# Patient Record
Sex: MALE | Employment: UNEMPLOYED | URBAN - METROPOLITAN AREA
[De-identification: names, ages, dates, MRNs, and addresses within clinical notes are randomized per-mention and may not be internally consistent; named-entity substitution may affect disease eponyms.]

---

## 2017-01-02 ENCOUNTER — PRE VISIT (OUTPATIENT)
Dept: OTOLARYNGOLOGY | Facility: CLINIC | Age: 62
End: 2017-01-02

## 2017-01-02 NOTE — TELEPHONE ENCOUNTER
9/15/16 note received from [facility], will forward to clinic.  Included:   Audiogram on 8/8/16

## 2017-01-02 NOTE — TELEPHONE ENCOUNTER
1.  Date/reason for appt: 1/23/17 right side traumatic facial nerve palsy   2.  Referring provider: MAXIMILIANO GALAN  3.  Call to patient (Yes / No - short description): no, referral  4.  Previous care at / records requested from: [facility]   5.  Other: 9/15/16 note received from [facility], will forward to clinic. Will need to request additional records.

## 2017-01-23 ENCOUNTER — OFFICE VISIT (OUTPATIENT)
Dept: OTOLARYNGOLOGY | Facility: CLINIC | Age: 62
End: 2017-01-23

## 2017-01-23 VITALS — WEIGHT: 240 LBS | HEIGHT: 69 IN | BODY MASS INDEX: 35.55 KG/M2

## 2017-01-23 DIAGNOSIS — R49.0 DYSPHONIA: ICD-10-CM

## 2017-01-23 DIAGNOSIS — R13.11 ORAL PHASE DYSPHAGIA: ICD-10-CM

## 2017-01-23 DIAGNOSIS — R47.1 DYSARTHRIA: Primary | ICD-10-CM

## 2017-01-23 DIAGNOSIS — G51.0 FACIAL PARALYSIS: ICD-10-CM

## 2017-01-23 RX ORDER — AMANTADINE HYDROCHLORIDE 100 MG/1
100 CAPSULE, GELATIN COATED ORAL
COMMUNITY
Start: 2016-12-20

## 2017-01-23 RX ORDER — VENLAFAXINE HYDROCHLORIDE 150 MG/1
150 CAPSULE, EXTENDED RELEASE ORAL
COMMUNITY
Start: 2016-10-17 | End: 2018-03-14

## 2017-01-23 RX ORDER — VENLAFAXINE HYDROCHLORIDE 75 MG/1
TABLET, EXTENDED RELEASE ORAL
COMMUNITY
Start: 2016-10-17 | End: 2018-03-14

## 2017-01-23 RX ORDER — VENLAFAXINE HYDROCHLORIDE 150 MG/1
TABLET, EXTENDED RELEASE ORAL
COMMUNITY
Start: 2016-12-09 | End: 2017-06-07

## 2017-01-23 RX ORDER — SILDENAFIL 100 MG/1
TABLET, FILM COATED ORAL PRN
COMMUNITY
Start: 2016-11-29

## 2017-01-23 RX ORDER — LORAZEPAM 1 MG/1
1 TABLET ORAL
COMMUNITY
Start: 2016-10-05 | End: 2017-12-13 | Stop reason: ALTCHOICE

## 2017-01-23 RX ORDER — ROSUVASTATIN CALCIUM 40 MG/1
TABLET, COATED ORAL
COMMUNITY
Start: 2009-05-01 | End: 2018-03-14

## 2017-01-23 RX ORDER — OMEPRAZOLE 40 MG/1
CAPSULE, DELAYED RELEASE ORAL EVERY MORNING
COMMUNITY
Start: 2016-10-30

## 2017-01-23 ASSESSMENT — PAIN SCALES - GENERAL: PAINLEVEL: NO PAIN (0)

## 2017-01-23 NOTE — NURSING NOTE
Photodocumentation and video obtained today. IOP measurements obtained today.   Patient given information regarding botox and filler for facial paralysis.   Sosa Mcintyre RN, [phone]  1/23/2017 3:37 PM

## 2017-01-23 NOTE — NURSING NOTE
Chief Complaint   Patient presents with     Consult     [facility] eye clinic referral     [facility]

## 2017-01-23 NOTE — LETTER
1/23/2017     RE: Omega Almeida  [address]  [place] [address]     Dear Colleague,    Thank you for referring your patient, Omega Almeida, to the [facility] EAR NOSE AND THROAT at [facility]. Please see a copy of my visit note below.    Facial Plastic and Reconstructive Surgery Consultation    Referring Provider:   Karen Call  [facility]  [address] [place] [address]    HPI:   I had to pleasure of seeing Omega Almeida today in clinic for consultation for facial weakness.  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   He was evaluated and [place] at [facility] [place]. 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     He had complete right sided facial paralysis at the time of injury. He notes that he began to have the return of function 2-3 months ago and believes it is still gradually improving.  He has not had an EMG to assess his facial function. He does describe hearing loss on the right side, had an initial audiogram but not one since. He believes his hearing to be improved also.  He denies any imbalance.     He notices difficulty with speech and that he has difficulty drinking liquids and using a straw. He has had to adapt his eating because of this.  He cannot hold air in his mouth.      Review Of Systems  ROS: 10 point ROS neg other than the symptoms noted above in the HPI.    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     Malignant neoplasm of posterior wall of urinary bladder (H)     Hand paresthesia     Obstructive sleep apnea syndrome     Pain in thoracic spine     Prostatitis     Pure hypercholesterolemia     Rotator cuff syndrome     Sebaceous cyst     Asymmetrical sensorineural hearing loss     Injury of musculoskeletal system     Testicular hypofunction     Traumatic brain injury (H)     Past Surgical History   Procedure Laterality Date     C nonspecific procedure  1962     stabismus/amblyopia     Current Outpatient Prescriptions   Medication Sig Dispense Refill     rosuvastatin (CRESTOR) 40 MG tablet        amantadine (SYMMETREL) 100 MG capsule        venlafaxine (EFFEXOR-XR) 150 MG 24 hr capsule Take 150 mg by mouth       omeprazole (PRILOSEC) 40 MG capsule        LORazepam (ATIVAN) 1 MG tablet Take 1 mg by mouth       DHA-EPA-VITAMIN E PO        venlafaxine (EFFEXOR-ER) 150 MG TB24 24 hr tablet        venlafaxine (EFFEXOR-ER) 75 MG TB24 24 hr tablet        sildenafil (VIAGRA) 100 MG cap/tab        MULTIVITAMIN TABS   OR 1 po qd       VITAMIN E 400 IU OR CAPS  1 po qd       LIPITOR 20 MG OR TABS 1 tab PO QD (Once per day)       PROZAC 40 MG OR CAPS 1 CAPSULE EVERY MORNING       No known drug allergies  Social History     Social History     Marital Status:      Spouse Name: john lentz     Number of Children: 5     Years of Education: 18     Occupational History     Not on file.     Social History Main Topics     Smoking status: Former Smoker -- 2.00 packs/day for 2 years     Types: Cigarettes     Start date: 09/07/1993     Quit date: 10/07/1996     Smokeless tobacco: Not on file      Comment: quit age 32     Alcohol Use: No      Comment: socially     Drug Use: No     Sexual Activity:     Partners: Female     Birth Control/ Protection: Post-menopausal     Other Topics Concern     Caffeine Concern Yes     1-2 cups qd     Exercise Yes     avid raquetball     Seat Belt Yes      Social History Narrative     Family History   Problem Relation Age of Onset     Breast Cancer Maternal Grandmother      CANCER Brother      ceased     HEART DISEASE Father      ceased     Depression Father        PE:  Alert and Oriented, Answering Questions Appropriately  Normocephalic, left intact facial nerve function  Significant hoarseness and voice strain  Right side with no brow elevation  perioribital oculo-oral synkinesis with narrowed right palpebral aperture  No right oral commissure excursion   Oral incompetence with incomplete lip closure, unable to hold air in mouth  platysmal synkinesis  Skin: Henry 2  Facial Nerve Intact and facial movement symmetric  EOM's, PEERL  Nasal Exam: No external Deformity, no mucopurulence or polyps, no masses  Chin: Normal   Lips/Teeth/Toungue/Gums: Lips intact, Normal Dentition, Occlusion intact, Oral mucosa intact, no lesions/ulcerations/masses, Tongue mobile  OP: Palate elevates with speech, Mallampati 2, Uvula midline  Neck: No lymphadenopathy, no thyromegaly, trachea midline  Chest: No wheezing, cyanosis, or stridor  Card: Normal upper extremity pulses and capillary refill, not diaphoretic  Neuro/Psych: CN's 2-12 intact except for right sided facial nerve palsy, Moves all extremities, ambulation in intact, positive affect, no notable muscle weakness      PROCEDURE:flexible fiberoptic laryngoscopy   Indication: trauma with voice strain   Performed by: Kaci Trevino     Fiberoptic Laryngoscopy is performed to examine the upper airway for pathology, functional or anatomic abnormality. Verbal consent was obtained. The flexible endoscope was passed into each nasal cavity separately.  Findings are as follows:   Velopharynx: normal with good elevation of the soft palate              Base of Tongue: normal   Supraglottis: Significant supraglottic hyperfunction              Larynx: bilateral true vocal fold motion is present.  No masses or lesions seen.                  Epiglottis intact with no lesions, pyriforms patent with no lesions.     The patient tolerated the procedure well with no complications.                     IMPRESSION:  (R47.1) Dysarthria  (primary encounter diagnosis)  Plan: SPEECH THERAPY REFERRAL    (R13.11) Oral phase dysphagia  Plan: SPEECH THERAPY REFERRAL    (R49.0) Dysphonia  Plan: SPEECH THERAPY REFERRAL       (G51.0) Facial paralysis  Plan: NEEDLE EMG GUIDE W CHEMODENERVATION, NEEDLE EMG        OF CRANIAL NERVE BILATERAL              PLAN:    Orders Placed This Encounter   Procedures     NEEDLE EMG GUIDE W CHEMODENERVATION     NEEDLE EMG OF CRANIAL NERVE BILATERAL     SPEECH THERAPY REFERRAL     SPEECH THERAPY REFERRAL     Omega Almeida has incomplete recovery of his right facial function after a fall with facial nerve injury. He has some contraction visible which per his report has had late onset of recovery.   I would like an EMG to categorize his enervation status.   He also has oral incompetence with oral phase dysphagia. I would like him to see Shannan Francisco. I also discussed the benefits of filler.   I believe Shannan would be very important in helping him with his speech also.     He had reanimation options in this time period, they would be surgical and I would seek them out sooner than later. The factor that clouds the decision at this time is the fact that he describes improvement int he recent months.     I would like to meet with him again and discuss further once I have his results.    He also has hoarseness and significant supraglottic hyperfunction on exam, I am going to refer him to our voice clinic.     Photodocumentation was obtained.     I spent a total of 45 minutes face-to-face with Omega Almeida during today's office visit.  Over 50% of this time was spent counseling the patient and/or coordinating care regarding his injury, his facial nerve status, describing treatment options, coordinating further work up and referrals. .   See note for details.      Again, thank you for allowing me to participate in the care of your patient.      Sincerely,    Kaci Trevino MD

## 2017-01-23 NOTE — Clinical Note
1/23/2017       RE: Omega Almeida  [address]  [place] [facility] [place] [facility] [address]     Dear Colleague,    Thank you for referring your patient, Omega Almeida, to the [facility] EAR NOSE AND THROAT at [facility]. Please see a copy of my visit note below.    No notes on file    Again, thank you for allowing me to participate in the care of your patient.      Sincerely,    Kaci Trevino MD

## 2017-01-23 NOTE — MR AVS SNAPSHOT
After Visit Summary   1/23/2017    Omega Almeida    MRN: [id number]           Patient Information     Date Of Birth          1955        Visit Information        Provider Department      1/23/2017 2:00 PM Kaci Trevino MD [facility] Ear Nose and Throat        Today's Diagnoses     Dysarthria    -  1    Oral phase dysphagia        Dysphonia        Facial paralysis           Follow-ups after your visit        Additional Services     SPEECH THERAPY REFERRAL       *This therapy referral will be filtered to a centralized scheduling office at [facility] and the patient will receive a call to schedule an appointment at a [place] location most convenient for them. *     [facility] provides Speech Therapy evaluation and treatment and many specialty services across the [place] system.  If requesting a specialty program, please choose from the list below.  If you have not heard from the scheduling office within 2 business days, please call [phone] for all locations, with the exception of [place], please call [phone].       Treatment: Evaluation & Treatment  Speech Treatment Diagnosis: Dysarthria & Oral Phase Dysphagia  Special Instructions: Please schedule with Shannan Francisco at [facility]  Special Programs: Facial Paralysis    Please be aware that coverage of these services is subject to the terms and limitations of your health insurance plan.  Call member services at your health plan with any benefit or coverage questions.      **Note to Provider:  If you are referring outside of [place] for the therapy appointment, please list the name of the location in the  special instructions  above, print the referral and give to the patient to schedule the appointment.            SPEECH THERAPY REFERRAL       *This therapy referral will be filtered to a centralized scheduling office at [facility] and the patient will receive a call to schedule  an appointment at a [place] location most convenient for them. *     [facility] provides Speech Therapy evaluation and treatment and many specialty services across the [place] system.  If requesting a specialty program, please choose from the list below.  If you have not heard from the scheduling office within 2 business days, please call [phone] for all locations, with the exception of [place], please call [phone].       Treatment: Evaluation & Treatment  Speech Treatment Diagnosis: Dysphonia  Special Instructions: Supraglottic hyper contracture  Special Programs: Referral to [facility]    Please be aware that coverage of these services is subject to the terms and limitations of your health insurance plan.  Call member services at your health plan with any benefit or coverage questions.      **Note to Provider:  If you are referring outside of [place] for the therapy appointment, please list the name of the location in the  special instructions  above, print the referral and give to the patient to schedule the appointment.                  Your next 10 appointments already scheduled     Apr 03, 2017 10:30 AM CDT   Treatment 45 with Belinda Francisco, SLP   [facility], [place], Speech Therapy - Oupatient ([facility])    [address], [address]  [place] [address]   [phone]              Who to contact     Please call your clinic at [phone] to:    Ask questions about your health    Make or cancel appointments    Discuss your medicines    Learn about your test results    Speak to your doctor   If you have compliments or concerns about an experience at your clinic, or if you wish to file a complaint, please contact [facility] Patient Relations at [phone] or email us at [email or web address]         Additional Information About Your Visit        MyChart Information     [employer] gives you  "secure access to your electronic health record. If you see a primary care provider, you can also send messages to your care team and make appointments. If you have questions, please call your primary care clinic.  If you do not have a primary care provider, please call [phone] and they will assist you.      [employer] is an electronic gateway that provides easy, online access to your medical records. With [employer], you can request a clinic appointment, read your test results, renew a prescription or communicate with your care team.     To access your existing account, please contact your [facility] or call [phone] for assistance.        Care EveryWhere ID     This is your Care EveryWhere ID. This could be used by other organizations to access your [facility] medical records  [id number]        Your Vitals Were     Height BMI (Body Mass Index)                1.753 m (5' 9\") 35.44 kg/m2           Blood Pressure from Last 3 Encounters:   11/21/03 136/78    Weight from Last 3 Encounters:   01/23/17 108.9 kg (240 lb)   11/21/03 102.1 kg (225 lb)              We Performed the Following     LARYNGOSCOPY FLEX FIBEROPTIC, DIAGNOSTIC     NEEDLE EMG GUIDE W CHEMODENERVATION     NEEDLE EMG OF CRANIAL NERVE BILATERAL     SPEECH THERAPY REFERRAL     SPEECH THERAPY REFERRAL        Primary Care Provider Office Phone # Fax #    Vinicius Bray [phone]       [facility] [address] [facility] [address]        Thank you!     Thank you for choosing [facility] EAR NOSE AND THROAT  for your care. Our goal is always to provide you with excellent care. Hearing back from our patients is one way we can continue to improve our services. Please take a few minutes to complete the written survey that you may receive in the mail after your visit with us. Thank you!             Your Updated Medication List - Protect others around you: Learn how to safely use, store " and throw away your medicines at www.disposemymeds.org.          This list is accurate as of: 1/23/17 11:59 PM.  Always use your most recent med list.                   Brand Name Dispense Instructions for use    amantadine 100 MG capsule    SYMMETREL         CRESTOR 40 MG tablet   Generic drug:  rosuvastatin          DHA-EPA-VITAMIN E PO          LIPITOR 20 MG tablet   Generic drug:  atorvastatin      1 tab PO QD (Once per day)       LORazepam 1 MG tablet    ATIVAN     Take 1 mg by mouth       MULTIVITAMIN TABS   OR      1 po qd       omeprazole 40 MG capsule    priLOSEC         PROZAC 40 MG capsule   Generic drug:  FLUoxetine      1 CAPSULE EVERY MORNING       * venlafaxine 150 MG 24 hr capsule    EFFEXOR-XR     Take 150 mg by mouth       * venlafaxine 75 MG Tb24 24 hr tablet    EFFEXOR-ER         * venlafaxine 150 MG Tb24 24 hr tablet    EFFEXOR-ER         VIAGRA 100 MG cap/tab   Generic drug:  sildenafil          vitamin E 400 UNIT capsule      1 po qd       * Notice:  This list has 3 medication(s) that are the same as other medications prescribed for you. Read the directions carefully, and ask your doctor or other care provider to review them with you.

## 2017-01-30 ENCOUNTER — PRE VISIT (OUTPATIENT)
Dept: OTOLARYNGOLOGY | Facility: CLINIC | Age: 62
End: 2017-01-30

## 2017-01-30 NOTE — TELEPHONE ENCOUNTER
1.  Date/reason for appt: 2/8/17 - dysphonia  2.  Referring provider: Dr. Jacqueline Trevino  3.  Call to patient (Yes / No - short description): no, referred / rec's from [facility] were already forwarded to clinic coordinators on 1/2/17  4.  Previous care at:   - [facility]   - [facility]

## 2017-01-31 ENCOUNTER — TRANSFERRED RECORDS (OUTPATIENT)
Dept: HEALTH INFORMATION MANAGEMENT | Facility: CLINIC | Age: 62
End: 2017-01-31

## 2017-02-06 NOTE — PROGRESS NOTES
Facial Plastic and Reconstructive Surgery Consultation    Referring Provider:   Karen Call  [facility]  [address] [facility] [address]  [place] [address]    HPI:   I had to pleasure of seeing Omega Almeida today in clinic for consultation for facial weakness.  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   He was evaluated and treated at [facility] where Dr. 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     He had complete right sided facial paralysis at the time of injury. He notes that he began to have the return of function 2-3 months ago and believes it is still gradually improving.  He has not had an EMG to assess his facial function. He does describe hearing loss on the right side, had an initial audiogram but not one since. He believes his hearing to be improved also.  He denies any imbalance.     He notices difficulty with speech and that he has difficulty drinking liquids and using a straw. He has had to adapt his eating because of this.  He cannot hold air in his mouth.      Review Of Systems  ROS: 10 point ROS neg other than the symptoms noted above in the HPI.    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 trauma with behavioral disturbance     Malignant neoplasm of posterior wall of urinary bladder (H)     Hand paresthesia     Obstructive sleep apnea syndrome     Pain in thoracic spine     Prostatitis     Pure hypercholesterolemia     Rotator cuff syndrome     Sebaceous cyst      Asymmetrical sensorineural hearing loss     Injury of musculoskeletal system     Testicular hypofunction     Traumatic brain injury (H)     Past Surgical History   Procedure Laterality Date     C nonspecific procedure  1962     stabismus/amblyopia     Current Outpatient Prescriptions   Medication Sig Dispense Refill     rosuvastatin (CRESTOR) 40 MG tablet        amantadine (SYMMETREL) 100 MG capsule        venlafaxine (EFFEXOR-XR) 150 MG 24 hr capsule Take 150 mg by mouth       omeprazole (PRILOSEC) 40 MG capsule        LORazepam (ATIVAN) 1 MG tablet Take 1 mg by mouth       DHA-EPA-VITAMIN E PO        venlafaxine (EFFEXOR-ER) 150 MG TB24 24 hr tablet        venlafaxine (EFFEXOR-ER) 75 MG TB24 24 hr tablet        sildenafil (VIAGRA) 100 MG cap/tab        MULTIVITAMIN TABS   OR 1 po qd       VITAMIN E 400 IU OR CAPS  1 po qd       LIPITOR 20 MG OR TABS 1 tab PO QD (Once per day)       PROZAC 40 MG OR CAPS 1 CAPSULE EVERY MORNING       No known drug allergies  Social History     Social History     Marital Status:      Spouse Name: john lentz     Number of Children: 5     Years of Education: 18     Occupational History     Not on file.     Social History Main Topics     Smoking status: Former Smoker -- 2.00 packs/day for 2 years     Types: Cigarettes     Start date: 09/07/1993     Quit date: 10/07/1996     Smokeless tobacco: Not on file      Comment: quit age 32     Alcohol Use: No      Comment: socially     Drug Use: No     Sexual Activity:     Partners: Female     Birth Control/ Protection: Post-menopausal     Other Topics Concern     Caffeine Concern Yes     1-2 cups qd     Exercise Yes     avid raquetball     Seat Belt Yes     Social History Narrative     Family History   Problem Relation Age of Onset     Breast Cancer Maternal Grandmother      CANCER Brother      ceased     HEART DISEASE Father      ceased     Depression Father        PE:  Alert and Oriented, Answering Questions Appropriately  Normocephalic,  left intact facial nerve function  Significant hoarseness and voice strain  Right side with no brow elevation  perioribital oculo-oral synkinesis with narrowed right palpebral aperture  No right oral commissure excursion   Oral incompetence with incomplete lip closure, unable to hold air in mouth  platysmal synkinesis  Skin: Henry 2  Facial Nerve Intact and facial movement symmetric  EOM's, PEERL  Nasal Exam: No external Deformity, no mucopurulence or polyps, no masses  Chin: Normal   Lips/Teeth/Toungue/Gums: Lips intact, Normal Dentition, Occlusion intact, Oral mucosa intact, no lesions/ulcerations/masses, Tongue mobile  OP: Palate elevates with speech, Mallampati 2, Uvula midline  Neck: No lymphadenopathy, no thyromegaly, trachea midline  Chest: No wheezing, cyanosis, or stridor  Card: Normal upper extremity pulses and capillary refill, not diaphoretic  Neuro/Psych: CN's 2-12 intact except for right sided facial nerve palsy, Moves all extremities, ambulation in intact, positive affect, no notable muscle weakness      PROCEDURE:flexible fiberoptic laryngoscopy   Indication: trauma with voice strain   Performed by: Kaci Trevino     Fiberoptic Laryngoscopy is performed to examine the upper airway for pathology, functional or anatomic abnormality. Verbal consent was obtained. The flexible endoscope was passed into each nasal cavity separately.  Findings are as follows:   Velopharynx: normal with good elevation of the soft palate              Base of Tongue: normal   Supraglottis: Significant supraglottic hyperfunction              Larynx: bilateral true vocal fold motion is present.  No masses or lesions seen.                 Epiglottis intact with no lesions, pyriforms patent with no lesions.     The patient tolerated the procedure well with no complications.                     IMPRESSION:  (R47.1) Dysarthria  (primary encounter diagnosis)  Plan: SPEECH THERAPY REFERRAL    (R13.11) Oral phase  dysphagia  Plan: SPEECH THERAPY REFERRAL    (R49.0) Dysphonia  Plan: SPEECH THERAPY REFERRAL       (G51.0) Facial paralysis  Plan: NEEDLE EMG GUIDE W CHEMODENERVATION, NEEDLE EMG        OF CRANIAL NERVE BILATERAL              PLAN:    Orders Placed This Encounter   Procedures     NEEDLE EMG GUIDE W CHEMODENERVATION     NEEDLE EMG OF CRANIAL NERVE BILATERAL     SPEECH THERAPY REFERRAL     SPEECH THERAPY REFERRAL     Omega Almeida has incomplete recovery of his right facial function after a fall with facial nerve injury. He has some contraction visible which per his report has had late onset of recovery.   I would like an EMG to categorize his enervation status.   He also has oral incompetence with oral phase dysphagia. I would like him to see Shannan Francisco. I also discussed the benefits of filler.   I believe Shannan would be very important in helping him with his speech also.     He had reanimation options in this time period, they would be surgical and I would seek them out sooner than later. The factor that clouds the decision at this time is the fact that he describes improvement int he recent months.     I would like to meet with him again and discuss further once I have his results.    He also has hoarseness and significant supraglottic hyperfunction on exam, I am going to refer him to our voice clinic.     Photodocumentation was obtained.     I spent a total of 45 minutes face-to-face with Omega Almeida during today's office visit.  Over 50% of this time was spent counseling the patient and/or coordinating care regarding his injury, his facial nerve status, describing treatment options, coordinating further work up and referrals. .  See note for details.

## 2017-02-09 ENCOUNTER — OFFICE VISIT (OUTPATIENT)
Dept: OTOLARYNGOLOGY | Facility: CLINIC | Age: 62
End: 2017-02-09

## 2017-02-09 DIAGNOSIS — R49.0 DYSPHONIA: Primary | ICD-10-CM

## 2017-02-09 NOTE — Clinical Note
"2/9/2017       RE: Omega Almeida  [address] [place] [address]     Dear Colleague,    Thank you for referring your patient, Omega Almeida, to the [facility]. Please see a copy of my visit note below.    [facility]  Elmer Kent Jr., M.D., F.A.C.S.  Racquel Solomon M.D., M.P.H.  Madelaine Stevenson, Ph.D., CCC/SLP  Coral Vogel M.M. (voice), M.A., CCC/SLP  Colt Hdz M.M. (voice), MBENY., [facility]SLP    [facility]  INITIAL EVALUATION AND LARYNGEAL EXAMINATION REPORT    Patient: Omega Almeida  Date of Visit: 2/9/2017    CHIEF COMPLAINT: Dysarthria, facial paralysis, hoarseness    HISTORY  PATIENT INFORMATION  Omega Almeida was seen for initial voice evaluation, laryngeal examination and treatment today.  He is seen at the kind referral of Dr. Kaci Hutchinson. Please refer to they physician s dictation for a more complete history and impressions.     DIAGNOSIS/REASON FOR REFERRAL  Dysphonia/ Evaluate, perform laryngeal exam, treat as appropriate    HISTORY OF VOICE DISORDER  Mr. Almeida is a 62 year old gentleman with a history of dysphonia.  Salient details of his history are as follows:    Traumatic brain injury in May of 2016    He was in a coma for 3 weeks, and hospitalized for 2 months, with extensive rehab subsequently    He had a complete right sided facial paralysis at the time of injury which began to recover 2-3 months ago    He feels that this is still ongoing    Changes to voice and speech quality began along the same timeline    His primary complaint is that he feels the right side of his face fatigues with speech    He notes lack of clarity with certain consonant sounds (he demonstrated \"th\" and bilabials)    He notes subtle ongoing memory issues, particularly with short term memory    CURRENT SYMPTOMS INCLUDE:  VOICE    Voice is raspy and rough    Vocal fatigue     He feels that his voice quality is " "\"more mediocre\" than in the past    He had significant vocal demands in his previous work as a , and would like to return to a vocally active job in the future.    Denies significant dyspnea, dysphagia, cough, nor pain    OTHER PERTINENT HISTORY    Please also refer to Dr. Hutchinson's dictation.     OBJECTIVE FINDINGS  VOICE AND SPEECH EVALUATION  An evaluation of the voice was accomplished and audio recorded today; salient features are as follows:    Breathing pattern: phonation is not coordinated with respiration and talks to past end of breath stream    Tension is evident: jaw and neck    VOICE:    No perceptible breathiness    Mild to moderate, Intermittent roughness    Moderate, Intermittent strangled strain during running speech. Does not seem to be phoneme dependent    Habitual pitch was not formally tested, but is judged to be WNL and appropriate    Intensity:     Conversational speech - informally judged to be WNL for the setting    Projected speech - appropriate increase in intensity with moderate increase in strain    Sustained phonation: increased breathiness, decreased roughness.  Consistent across vowel contexts    Pitch Glide task: patient is able to access upper register, but with significant increased strain and intermittent instability during register transitions.     Singing vs. Speech - singing demonstrates reduced strain and roughness compared to speech    He states today is a tpical voice day    He rates his effort as 0 out of 10 (10 is maximum effort) for speech; 1 out of 10 for singing    He rates overall voice quality as 2 out of 10 (10 being worst)    GLOBAL ASSESSMENT OF DYSPHONIA:  16/100    CAPE-V Overall Severity:  28/100    SPEECH:    Formal testing of articulation was not completed    Intelligibility informally judged to be greater than 90% intelligible     Primary errors included intermittent distortion of bilabial stops and continuants, labiodental and " interdental fricatives    COUGH/AIRWAY:    Not observed    LARYNGEAL EXAMINATION  Kolby Hdz M.M., M.A., CCC/SLP accomplished the endoscopic laryngeal examination today.  Verbal consent was obtained and witnessed prior to this procedure.   A time-out was performed, verifying patient, procedure, and site.   Type of exam:   Procedure: Flexible endoscopy with chip-tip technology with stroboscopy, right nostril; topical anesthesia with 3% Lidocaine and 0.25% phenylephrine was applied.   This exam shows:    Essentially healthy laryngeal mucosa    Signs of reflux: posterior commissure hypertrophy    Secretions:  Mild to moderate diffuse presence of thickened secretions on the vocal folds and throughout the laryngeal area    Vocal fold mucosa: slightly concave vibratory margins bilaterally    Vocal fold function:   o Vocal folds are mobile and meet at midline  o Movement is brisk and symmetric  o Exam is neurologically normal     Airway is adequate    elongation of the vocal folds for pitch increase is normal    Severe four-way constriction of the supraglottic larynx during connected speech with excessive contracture at the posterior glottis    Therapy probes show reduced hyperfunction with improved respiratory phonatory coordination and forward resonant stimuli    The addition of stroboscopy provided the following information:   o Amplitude: WNL bilaterally  o Mucosal Wave: moderately increased bilaterally  o Glottic closure:  Anterior glottal gap at elevated F0.  Open phase predominate closure pattern  o Symmetry:  Intermittent asymmetry  o Periodicity: essentially regular      Subtly concave vibratory margins      Supraglottic hyperfunction during running speech      Anterior glottal gap at elevated F0    I reviewed this laryngeal exam with Mr. Almeida today, and I provided pertinent explanations, as well as written and oral information.    THERAPEUTIC ACTIVITIES  Today Mr. Almeida participated in the following  therapeutic activities:    Asked many questions about the nature of his symptoms, and I answered all of these thoroughly.    Exercises to promote optimal respiratory mechanics    I provided explanation of the anatomy and physiology of respiration for speech and singing; he found this to be helpful    he demonstrated excessive upper thoracic engagement during inhalation    Demonstrated difficulty allowing abdominal relaxation for inhalation    Practiced in a forward leaning seated posture, with tactile cue of a hand on the low rib-cage to facilitate awareness of low respiratory engagement    With clinician support, patient was able to demonstrate improved abdominal relaxation and engagement on inhalation    Semi-Occluded Vocal Tract (SOVT) exercises instructed to reduce laryngeal tension, promote vocal fold pliability, and coordinate respiration and phonation    Straw with water resistance was found to be most facilitating     Sustained phonation, and voice vs. voiceless productions used to promote easy voicing and raise awareness of laryngeal tension    Ascending and descending glides utilized to promote vocal fold pliability    Advanced to /w/ phoneme to promote forward locus of resonance     Instructed to use these exercises as a warm-up / cooldown, and to re-calibrate the voice throughout the day.    Cues to feel a sensation of yawn were facilitative for promoting improved resonance and reduced strain    75% accuracy with minimal clinician support    Flow phonation based exercises to promote improved respiratory phonatory coordination    Spacious speech stimuli utilized with /h/ initial stimuli    Patient demonstrated tendency toward descending inflection dipping into lower than optimal pitch and glottal lira    Chant-like speech was facilitative for reducing this tendency, while promoting consistent airflow      Concepts of an optimal regimen for practice were instructed.    He should use an interval schedule of  practice, with brief periods of practice frequently throughout each day    DISH concepts of volitional practice to facilitate motor learning.    I provided an audio recording and handouts of today's therapeutic activities to facilitate practice.    IMPRESSIONS/ RECOMMENDATIONS/ PLAN  IMPRESSIONS / RECOMMENDATIONS  Based on today's evaluation and laryngeal examination, it appears that:    Laryngeal examination demonstrated essentially healthy laryngeal mucosa, vocal folds with mildly concave vibratory margins, open phase predominate closure, and increased mucosal wave consistent with reduced muscle tone.    Dysphonia is accounted for by the hyperfunction of the intrinsic and extrinsic laryngeal musculature in combination with non-optimal coordination of respiration and phonation    A brief course of speech therapy is recommended to optimize vocal technique and promote reduced discomfort, effort and fatigue.    He is entirely amenable to this plan    We began therapy today, working on strategies to improve respiratory phonatory coordination and reduced supraglottic hyperfunction    TREATMENT PLAN  Speech therapy    DURATION/FREQUENCY OF TREATMENT  Three bi-weekly, one-hour sessions, with two monthly one-hour follow-up sessions    PROGNOSIS  Good prognosis for voice improvement with speech therapy and regular practice of therapeutic activities.    BARRIERS TO LEARNING/TEACHING AND LEARNING NEEDS  None/Unremarkable    GOALS  Patient goal:   1. To improve and maintain a healthy voice quality  2. To understand the problem and fix it as much as possible    Short-term goal(s): Within the first 4 sessions, Mr. Almeida:  1. will demonstrate assigned laryngeal massage techniques with 80% accuracy or better with no clinician support  2. will be able to independently list key factors in maintenance of good vocal hygiene with 80% accuracy, and report on their use outside the therapy room.  3. will utilize silent inhalation  with good low-respiratory engagement 75% of the time during therapy tasks with minimal clinician support  4. will demonstrate semi-occluded vocal tract (SOVT) exercises with at least 80% accuracy with no clinician support  5. will accurately identify target vs. habitual voice quality during therapy tasks in 4 out of 5 trials with no clinician support    Long-term goal(s): In 6 months, Mr. Almeida will:  1. Report a week of typical activities, in which dysphonia does not exceed a level of 2 out of 10, 80% of the time    PRIMARY ICD-10 code:  R49.0 (Dysphonia)      TOTAL SERVICE TIME: 100 minutes  EVALUATION OF VOICE AND RESONANCE: (20828): 30 minutes    TREATMENT (34635): 50 minutes  ENDOSCOPIC LARYNGEAL EXAMINATION WITH STROBOSCOPY (57000): 20 minutes  NO CHARGE FACILITY FEE (98766)    Kolby Hdz M.M., M.A., CCC-SLP  Speech-Language Pathologist  Certificate of Vocology  [phone]

## 2017-02-09 NOTE — PROGRESS NOTES
"[facility] VOICE CLINIC  Elmer Kent Jr., M.D., F.A.C.S.  Racquel Solomon M.D., M.P.H.  Madelaine Stevenson, Ph.D., CCC/SLP  Coral Vogel M.M. (voice), M.A., CCC/SLP  Colt Hdz M.M. (voice), MBENY., [facility]/SLP    [facility] VOICE [facility]  INITIAL EVALUATION AND LARYNGEAL EXAMINATION REPORT    Patient: Omega Almeida  Date of Visit: 2/9/2017    CHIEF COMPLAINT: Dysarthria, facial paralysis, hoarseness    HISTORY  PATIENT INFORMATION  Omega Almeida was seen for initial voice evaluation, laryngeal examination and treatment today.  He is seen at the kind referral of Dr. Kaci Hutchinson. Please refer to they physician s dictation for a more complete history and impressions.     DIAGNOSIS/REASON FOR REFERRAL  Dysphonia/ Evaluate, perform laryngeal exam, treat as appropriate    HISTORY OF VOICE DISORDER  Mr. Almeida is a 62 year old gentleman with a history of dysphonia.  Salient details of his history are as follows:    Traumatic brain injury in May of 2016    He was in a coma for 3 weeks, and hospitalized for 2 months, with extensive rehab subsequently    He had a complete right sided facial paralysis at the time of injury which began to recover 2-3 months ago    He feels that this is still ongoing    Changes to voice and speech quality began along the same timeline    His primary complaint is that he feels the right side of his face fatigues with speech    He notes lack of clarity with certain consonant sounds (he demonstrated \"th\" and bilabials)    He notes subtle ongoing memory issues, particularly with short term memory    CURRENT SYMPTOMS INCLUDE:  VOICE    Voice is raspy and rough    Vocal fatigue     He feels that his voice quality is \"more mediocre\" than in the past    He had significant vocal demands in his previous work as a , and would like to return to a vocally active job in the future.    Denies significant dyspnea, dysphagia, cough, nor pain    OTHER PERTINENT HISTORY    Please " also refer to Dr. Hutchinson's dictation.     OBJECTIVE FINDINGS  VOICE AND SPEECH EVALUATION  An evaluation of the voice was accomplished and audio recorded today; salient features are as follows:    Breathing pattern: phonation is not coordinated with respiration and talks to past end of breath stream    Tension is evident: jaw and neck    VOICE:    No perceptible breathiness    Mild to moderate, Intermittent roughness    Moderate, Intermittent strangled strain during running speech. Does not seem to be phoneme dependent    Habitual pitch was not formally tested, but is judged to be WNL and appropriate    Intensity:     Conversational speech - informally judged to be WNL for the setting    Projected speech - appropriate increase in intensity with moderate increase in strain    Sustained phonation: increased breathiness, decreased roughness.  Consistent across vowel contexts    Pitch Glide task: patient is able to access upper register, but with significant increased strain and intermittent instability during register transitions.     Singing vs. Speech - singing demonstrates reduced strain and roughness compared to speech    He states today is a tpical voice day    He rates his effort as 0 out of 10 (10 is maximum effort) for speech; 1 out of 10 for singing    He rates overall voice quality as 2 out of 10 (10 being worst)    GLOBAL ASSESSMENT OF DYSPHONIA:  16/100    CAPE-V Overall Severity:  28/100    SPEECH:    Formal testing of articulation was not completed    Intelligibility informally judged to be greater than 90% intelligible     Primary errors included intermittent distortion of bilabial stops and continuants, labiodental and interdental fricatives    COUGH/AIRWAY:    Not observed    LARYNGEAL EXAMINATION  Kolby Hdz M.M., M.A., CCC/SLP accomplished the endoscopic laryngeal examination today.  Verbal consent was obtained and witnessed prior to this procedure.   A time-out was performed, verifying  patient, procedure, and site.   Type of exam:   Procedure: Flexible endoscopy with chip-tip technology with stroboscopy, right nostril; topical anesthesia with 3% Lidocaine and 0.25% phenylephrine was applied.   This exam shows:    Essentially healthy laryngeal mucosa    Signs of reflux: posterior commissure hypertrophy    Secretions:  Mild to moderate diffuse presence of thickened secretions on the vocal folds and throughout the laryngeal area    Vocal fold mucosa: slightly concave vibratory margins bilaterally    Vocal fold function:   o Vocal folds are mobile and meet at midline  o Movement is brisk and symmetric  o Exam is neurologically normal     Airway is adequate    elongation of the vocal folds for pitch increase is normal    Severe four-way constriction of the supraglottic larynx during connected speech with excessive contracture at the posterior glottis    Therapy probes show reduced hyperfunction with improved respiratory phonatory coordination and forward resonant stimuli    The addition of stroboscopy provided the following information:   o Amplitude: WNL bilaterally  o Mucosal Wave: moderately increased bilaterally  o Glottic closure:  Anterior glottal gap at elevated F0.  Open phase predominate closure pattern  o Symmetry:  Intermittent asymmetry  o Periodicity: essentially regular      Subtly concave vibratory margins      Supraglottic hyperfunction during running speech      Anterior glottal gap at elevated F0    I reviewed this laryngeal exam with Mr. Almeida today, and I provided pertinent explanations, as well as written and oral information.    THERAPEUTIC ACTIVITIES  Today Mr. Almeida participated in the following therapeutic activities:    Asked many questions about the nature of his symptoms, and I answered all of these thoroughly.    Exercises to promote optimal respiratory mechanics    I provided explanation of the anatomy and physiology of respiration for speech and singing; he found this  to be helpful    he demonstrated excessive upper thoracic engagement during inhalation    Demonstrated difficulty allowing abdominal relaxation for inhalation    Practiced in a forward leaning seated posture, with tactile cue of a hand on the low rib-cage to facilitate awareness of low respiratory engagement    With clinician support, patient was able to demonstrate improved abdominal relaxation and engagement on inhalation    Semi-Occluded Vocal Tract (SOVT) exercises instructed to reduce laryngeal tension, promote vocal fold pliability, and coordinate respiration and phonation    Straw with water resistance was found to be most facilitating     Sustained phonation, and voice vs. voiceless productions used to promote easy voicing and raise awareness of laryngeal tension    Ascending and descending glides utilized to promote vocal fold pliability    Advanced to /w/ phoneme to promote forward locus of resonance     Instructed to use these exercises as a warm-up / cooldown, and to re-calibrate the voice throughout the day.    Cues to feel a sensation of yawn were facilitative for promoting improved resonance and reduced strain    75% accuracy with minimal clinician support    Flow phonation based exercises to promote improved respiratory phonatory coordination    Spacious speech stimuli utilized with /h/ initial stimuli    Patient demonstrated tendency toward descending inflection dipping into lower than optimal pitch and glottal lira    Chant-like speech was facilitative for reducing this tendency, while promoting consistent airflow      Concepts of an optimal regimen for practice were instructed.    He should use an interval schedule of practice, with brief periods of practice frequently throughout each day    [place] concepts of volitional practice to facilitate motor learning.    I provided an audio recording and handouts of today's therapeutic activities to facilitate practice.    IMPRESSIONS/ RECOMMENDATIONS/  PLAN  IMPRESSIONS / RECOMMENDATIONS  Based on today's evaluation and laryngeal examination, it appears that:    Laryngeal examination demonstrated essentially healthy laryngeal mucosa, vocal folds with mildly concave vibratory margins, open phase predominate closure, and increased mucosal wave consistent with reduced muscle tone.    Dysphonia is accounted for by the hyperfunction of the intrinsic and extrinsic laryngeal musculature in combination with non-optimal coordination of respiration and phonation    A brief course of speech therapy is recommended to optimize vocal technique and promote reduced discomfort, effort and fatigue.    He is entirely amenable to this plan    We began therapy today, working on strategies to improve respiratory phonatory coordination and reduced supraglottic hyperfunction    TREATMENT PLAN  Speech therapy    DURATION/FREQUENCY OF TREATMENT  Three bi-weekly, one-hour sessions, with two monthly one-hour follow-up sessions    PROGNOSIS  Good prognosis for voice improvement with speech therapy and regular practice of therapeutic activities.    BARRIERS TO LEARNING/TEACHING AND LEARNING NEEDS  None/Unremarkable    GOALS  Patient goal:   1. To improve and maintain a healthy voice quality  2. To understand the problem and fix it as much as possible    Short-term goal(s): Within the first 4 sessions, Mr. Almeida:  1. will demonstrate assigned laryngeal massage techniques with 80% accuracy or better with no clinician support  2. will be able to independently list key factors in maintenance of good vocal hygiene with 80% accuracy, and report on their use outside the therapy room.  3. will utilize silent inhalation with good low-respiratory engagement 75% of the time during therapy tasks with minimal clinician support  4. will demonstrate semi-occluded vocal tract (SOVT) exercises with at least 80% accuracy with no clinician support  5. will accurately identify target vs. habitual voice quality  during therapy tasks in 4 out of 5 trials with no clinician support    Long-term goal(s): In 6 months, Mr. Almeida will:  1. Report a week of typical activities, in which dysphonia does not exceed a level of 2 out of 10, 80% of the time    PRIMARY ICD-10 code:  R49.0 (Dysphonia)      TOTAL SERVICE TIME: 100 minutes  EVALUATION OF VOICE AND RESONANCE: (00090): 30 minutes    TREATMENT (72647): 50 minutes  ENDOSCOPIC LARYNGEAL EXAMINATION WITH STROBOSCOPY (57411): 20 minutes  NO CHARGE FACILITY FEE (07235)    Kolby Hdz M.M., M.A., CCC-SLP  Speech-Language Pathologist  Certificate of Vocology  [phone]

## 2017-03-20 ENCOUNTER — HOSPITAL ENCOUNTER (OUTPATIENT)
Dept: SPEECH THERAPY | Facility: CLINIC | Age: 62
Setting detail: THERAPIES SERIES
End: 2017-03-20
Attending: OTOLARYNGOLOGY
Payer: COMMERCIAL

## 2017-03-20 PROBLEM — R49.0 DYSPHONIA: Status: ACTIVE | Noted: 2017-03-20

## 2017-03-20 PROBLEM — R13.11 ORAL PHASE DYSPHAGIA: Status: ACTIVE | Noted: 2017-03-20

## 2017-03-20 PROBLEM — G51.0 FACIAL PARALYSIS: Status: ACTIVE | Noted: 2017-03-20

## 2017-03-20 PROBLEM — R47.1 DYSARTHRIA: Status: ACTIVE | Noted: 2017-03-20

## 2017-03-20 PROCEDURE — 92610 EVALUATE SWALLOWING FUNCTION: CPT | Mod: GN | Performed by: SPEECH-LANGUAGE PATHOLOGIST

## 2017-03-20 PROCEDURE — 92507 TX SP LANG VOICE COMM INDIV: CPT | Mod: GN | Performed by: SPEECH-LANGUAGE PATHOLOGIST

## 2017-03-20 PROCEDURE — 40000230 ZZH STATISTIC SPEECH FACIAL PARALYSIS VISIT: Performed by: SPEECH-LANGUAGE PATHOLOGIST

## 2017-03-20 PROCEDURE — 92522 EVALUATE SPEECH PRODUCTION: CPT | Mod: GN | Performed by: SPEECH-LANGUAGE PATHOLOGIST

## 2017-03-21 NOTE — PROGRESS NOTES
" Speech Pathology/Facial Paralysis Initial Evaluation - 03/20/17 1000   Visit Type   Visit Type Initial   Patient Type   Patient Type Adult   General Patient Information   Start Of Care Date 03/20/17   Referring Physician Dr. Kaci Hutchinson   Orders Eval And Treat   Orders Date 01/23/17   Medical Diagnosis Dysarthria; Oral Phase Dysarthria   Onset Of Illness/injury Or Date Of Surgery (May 2016, TBI)   Surgical/Medical History Reviewed Yes   Pertinent History Of Current Problem Per notes of Dr. 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 I knocked myself unconsious. I had a couple breaks in my skull. they took me to [facility] [place]. I was in a coma for two weeks and in the hospital for 2months. The right side of my mouth weakness is getting better, I'm not surei if Itherapy or surgery will help. Met with Dr. Waller and she felt the nerve was not severed. I did have some voice therapy with Colt Hdz, and he gave me some exercises. I don't  have paralysis in my chords. Unfortunately I was let go from my position. How much of my cognitive skills have been let go. I'm starting a program, the [facility] program at [facility] tomorrow. I feel as though the lead piece in my eye should come out. I'm going to f/u within the next 30 days. I'm seeing a physiatrist through [facility] and it's going well. My cheek feels tight, mostly after I talk, it cramps. I have to be careful when drinking, I still will drool, It's easier to drink from a cup than a bottle. Food gets stuck on the right and I favor the left for chewing. Rinsing my mouth is challenging. Speech is a little more gravelly, the quality has changed. Lip sounds are more challenging.\" (Eye closure/synkinesis with lip sounds noted.)   Previous Treatment " Physical/speech therapy;Medications;Surgery  (OT)   General Health Cancer   Diagnostic Tests MRI;CT scan;Hearing tests;Vision test   Occupation Retired Exec   (Wants to return to work)   Sensory Changes None   Pain Description Discomfort from facial tightness, fluctuates   Hearing Changes Loss  (on Right)   Eye Problems Excessive tearing  (With eating)   Oral Habits Unilateral chewing   Patient's Concerns tearing;difficulty drinking ;difficulty eating;altered sense of taste;difficulty speaking ;difficulty being understood over the phone;difficulty with oral hygiene;difficulty whistling   Patient/Family Goals Improve above issues   Evaluation Results: Resting Tone and Symmetry/Oral status   Palpebral Fissure - Type of Eye Tone  Narrow;Eyelid surgery  (1 mm)   Nasolabial Fold  More Pronounced   Lips  - Type of Lip Tone  Elevated   Chin  - Type of Chin Tone  Helen  (slightly)   Type of Forehead Wrinkles Less   Type of Eye Wrinkles More    Type of Cheek/Mouth Wrinkles More   Oral Rest Posture WNL   Evaluation Results: Forehead Elevation   Forehead Strength Rating % 2%   Forehead - Synkinesis  Orbicularis Occuli;Levators;Zygomaticus;Platysma   Forehead General Severity of Synkinesis   moderate   Evaluation Results: Minimal Effort Eye Closure    Complete Yes   Strength Rating % Reduced   Eye Closure Synkinesis   Zygomaticus;Mentalis;Platysma   General Severity of Synkinesis   mild   Evaluation Results: Open Mouth Smile    Open Smile Strength Rating % 30%   Open Smile Synkinesis   Orbicularis Occuli;Mentalis   Open Smile General Severity of Synkinesis   mild   Evaluation Results: Closed Mouth Smile    Closed Mouth Smile Strength Rating % 60%   Closed Mouth Smile Synkinesis   Orbicularis Occuli;Mentalis;Platysma   Closed Mouth Smile General Severity of Synkinesis   mild to moderate   Evaluation Results: Snarl   Snarl Strength Rating % 20%   Snarl Synkinesis  Orbicularis Occuli;Zygomaticus;Mentalis   Snarl General  Severity of Synkinesis   mild to moderate   Evaluation Results: Smirk   Smirk Strength rating % 60%   Smirk Synkinesis  Orbicularis Occuli;Mentalis;Platysma   Smirk General Severity of Synkinesis   mild   Evaluation Results: Pucker   Strength Rating % 20%   Pucker Synkinesis   Orbicularis Occuli;Levators;Zygomaticus;Mentalis;Platysma   General Severity of Synkinesis   severe    Evaluation Results: Compression   Strength Rating % 30%   Compression Synkinesis   Orbicularis Occuli;Levators;Zygomaticus;Mentalis;Platysma   General Severity of Synkinesis   moderate   Evaluation Results: Contraction   Strength Rating % 40%   Contraction Synkinesis   Orbicularis Occuli;Levators;Zygomaticus;Mentalis;Platysma   General Severity of Synkinesis   moderate to severe   Evaluation Results: Protrusion   Strength Rating % 10%   Protrusion Synkinesis   Orbicularis Occuli;Levators;Zygomaticus;Mentalis;Platysma   General Severity of Synkinesis   moderate to severe   Evaluation Results: Pout   Strength Rating % 70%   Pout Synkinesis   Orbicularis Occuli;Levators;Zygomaticus;Mentalis;Platysma   General Severity of Synkinesis   mild to moderate   Evaluation Results: Frown   Strength Rating % 10%   Frown Synkinesis   Orbicularis Occuli;Levators;Zygomaticus;Mentalis;Platysma   General Severity of Synkinesis   moderate   Evaluation Results: Lower Lip Depression   Strength Rating % 2%   Lower Lip Depression Synkinesis   Orbicularis Occuli;Levators;Zygomaticus;Mentalis;Platysma   General Severity of Synkinesis   mild   Evaluation Results: Chin Tone (Mentalis)   Strength Rating % 80%   Chin Tone (Mentalis) Synkinesis  Orbicularis Occuli;Zygomaticus;Platysma   General Severity of Synkinesis   mild   Evaluation Results: Tongue Movement   Evaluation Results: Tongue Movement Reduced tone on right   Tongue Protrusion WNL   Elevation/Depression Extraorally (WNL)   Elevation/Depression Intraorally Deviates to left on depression   Presence of Synkinesis  with Lingual Movements Orbicularis oculi - mild   Evaluation Results: Speech Function   Evaluation Results: Speech Function Reduced lip movement on the right;Impaired labial sounds, (e.g. p, b, m, f, v)   Synkinesis with Sound-Lip Rounding Orbicularis Occuli;Zygomaticus;Mentalis;Platysma   General Severity of Synkinesis with Sound-Lip Rounding Moderate   Synkinesis with Sound-Lip Pressure Orbicularis Occuli;Zygomaticus;Mentalis;Platysma   General Severity of Synkinesis with Sound-Lip Pressure moderate   General Therapy Interventions   Planned Therapy Interventions Oral Stage Swallowing Therapy;Facial Therapy;Improve Facial Tone and Function;Improve Speech Intelligibilty   Clinical Impressions   Criteria for Skilled Therapeutic Interventions Met yes;treatment indicated   Facial Grading Score 24.3/100   House-Brackmann Score IV-V/VI   Communication Diagnosis Dysarthria; Nonverbal Communication Impairment   Swallowing Diagnosis Oral Stage Dysphagia   Rehab Potential good to achieve stated therapy goal(s)   Therapy Frequency  (1 x/2-4 weeks)   Predicted Duration of Therapy Intervention (days/weeks) 12 months   Risks and Benefits of Treatment have been explained yes   Patient, family and/or staff in agreement yes   Facial Paralysis Goals   Facial Paralysis Goals 1;2;3   Facial Paralysis Goal 1   Goal Identifier Speech    Goal Description Patient will report a 25% improvement in ability to understood over the phone in comparison with status noted on date of initial evalutiaon.   Target Date 05/20/17   Facial Paralysis Goal 2   Goal Identifier Oral Phase Swallowing   Goal Description Patient will report a 25% improvement in ability to drink from a bottle or can in comparison with status noted at time of initial evaluation.   Target Date 05/20/17   Facial Paralysis Goal 3   Goal Identifier Nonverbal Communication   Goal Description Patient will demonstrate a 10 point gain on her Facial Grading Score, per therapist  judgement, reflecting gains in orofacial resting tone, voluntary movement and minimization of synkinesis if present.    Target Date 06/20/17   Education Assessment   Preferred Learning Style Listening;Reading;Demonstration;Pictures/video   Barriers to Learning No barriers   Total Evaluation Time   Total Evaluation Time 45  (30 Speech/Facial, 15 Oral Stage Swallowing (see separate rep)

## 2017-03-21 NOTE — PROGRESS NOTES
"   Speech Pathology Clinical Swallow Evaluation - 03/20/17 1000   General Information   Type Of Visit Initial   Start Of Care Date 03/20/17   Referring Physician Kaci Trevino MD   Orders Evaluate And Treat   Medical Diagnosis Oral Phase Dyphagia   Onset Of Illness/injury Or Date Of Surgery (May 2016, TBI)   Pertinent History of Current Problem/OT: Additional Occupational Profile Info See Speech Pathology/Facial Paralysis report of this date for details. Per patient:I have to be careful when drinking, I still will drool, It's easier to drink from a cup than a bottle. Food gets stuck on the right and I favor the left for chewing. Rinsing challenging.   (Altered sense of taste reported as well.)   Patient Role/employment History (Retired Exec )   Living Environment House/[facility]   General Observations Pleasant and highly motivated to improve.   Patient/family Goals Improve swallowing function.   Clinical Swallow Evaluation   Oral Musculature anomalies present   Structural Abnormalities present   Dentition present and adequate   Secretion Management right corner drooling   Oral Labial Strength and Mobility impaired retraction;impaired pursing;impaired seal;impaired coordination   Lingual Strength and Mobility impaired protrusion;impaired coordination   Buccal Strength and Mobility impaired   Additional evaluation(s) completed today Yes  (Speech/Facial Paralysis - see report of this date.)   Clinical Swallow Eval: Thin Liquid Texture Trial   Mode of Presentation, Thin Liquids cup;self-fed   Volume of Liquid or Food Presented 4 oz.   Oral Phase of Swallow WFL   Successful Strategies Trialed During Procedure (throat clear with subsequent swallow)   Diagnostic Statement Occasional sense of liquid materials getting \"stuck\", clears throat to address.  (synkinesis in eye - moderate)   Clinical Swallow Eval: Solid Food Texture Trial   Mode of Presentation, Solid self-fed   Volume of Solid Food Presented 1 Konstantin " Cracker   Oral Phase, Solid other (see comments)  (slightly delayed bolus formation/transit)   Oral Residue, Solid (Occasionally on right)   Successful Strategies Trialed During Procedure, Solid (Patient alternates sips of liquid with bites of solids.)   Diagnostic Statement Occasional sense of materil getting stuck, clears throat to address.  (synkinesis in eye - moderate)   Swallow Compensations   Swallow Compensations Alternate viscosity of consistencies;Multiple swallow   Results Other (see comments)  (Mild oral difficulty noted, pharyngeal issue noted at home)   Educational Assessment   Barriers to Learning No barriers   Preferred Learning Style Listening;Reading;Demonstration;Pictures/video   General Therapy Interventions   Planned Therapy Interventions Dysphagia Treatment;Communication   Dysphagia treatment Compensatory strategies for swallowing  (Orofacial sensory motor exercise (pharyngeal strengthening exercises as indicated.)   Communication (See Speech Pathology/Facial Paralysis Evaluation)   Swallow Eval: Clinical Impressions   Skilled Criteria for Therapy Intervention Skilled criteria met.  Treatment indicated.   Functional Assessment Scale (FAS) 5   Dysphagia Outcome Severity Scale (PABLO) Level 6 - PABLO   Treatment Diagnosis Mild oral stage dysphagia charaterized by mild labial and linqual weakness/reduced coordination for forming a seal on drinking utensils, clearing food on right, transiting material posteriorally; possible mild reduced coordination for pharyngeal stage of swallow (not noted today, will f/u and determine need for oropharyngeal strengthening exercises).    Diet texture recommendations Regular diet;Thin liquids   Recommended Feeding/Eating Techniques alternate between small bites and sips of food/liquid;check mouth frequently for oral residue/pocketing;small sips/bites   Rehab Potential good, to achieve stated therapy goals   Therapy Frequency (1 x/2-4 weeks in coordination with  "Speech/Facial Paralysis therapy)   Predicted Duration of Therapy Intervention (days/wks) 4 months   Anticipated Discharge Disposition home   Risks and Benefits of Treatment have been explained. Yes   Patient, family and/or staff in agreement with Plan of Care Yes   Swallow Goals   SLP Swallow Goals 1;2;3   Swallow Goal 1   Goal Identifier Oral Phase Swallowing   Goal Description Patient will report a 25% improvement in ability to drink from a bottle or can in comparison with status noted at time of initial evaluation.   Target Date 05/20/17   Swallow Goal 2   Goal Identifier Oral Phase Swallowing    Goal Description Patient will report a 25% decrease in food pocketing on right in comparison of status noted at date of initial evaluation.    Target Date 05/20/17   Swallow Goal 3   Goal Identifier Pharyngeal Phase Swallowing   Goal Description Therapist will monitor patient reports of feeling foods or liquids occasionally feeling \"stuck\" and will provide oropharyngeal strengthening exercises as indicated.   Target Date 05/20/17   Total Session Time   Total Session Time 15     "

## 2017-04-03 ENCOUNTER — HOSPITAL ENCOUNTER (OUTPATIENT)
Dept: SPEECH THERAPY | Facility: CLINIC | Age: 62
Setting detail: THERAPIES SERIES
End: 2017-04-03
Attending: OTOLARYNGOLOGY
Payer: COMMERCIAL

## 2017-04-03 PROCEDURE — 40000230 ZZH STATISTIC SPEECH FACIAL PARALYSIS VISIT: Performed by: SPEECH-LANGUAGE PATHOLOGIST

## 2017-04-03 PROCEDURE — 92507 TX SP LANG VOICE COMM INDIV: CPT | Mod: GN | Performed by: SPEECH-LANGUAGE PATHOLOGIST

## 2017-04-03 PROCEDURE — 92526 ORAL FUNCTION THERAPY: CPT | Mod: GN | Performed by: SPEECH-LANGUAGE PATHOLOGIST

## 2017-05-08 ENCOUNTER — HOSPITAL ENCOUNTER (OUTPATIENT)
Dept: SPEECH THERAPY | Facility: CLINIC | Age: 62
Setting detail: THERAPIES SERIES
End: 2017-05-08
Attending: OTOLARYNGOLOGY
Payer: COMMERCIAL

## 2017-05-08 PROCEDURE — 92507 TX SP LANG VOICE COMM INDIV: CPT | Mod: GN | Performed by: SPEECH-LANGUAGE PATHOLOGIST

## 2017-05-08 PROCEDURE — 40000230 ZZH STATISTIC SPEECH FACIAL PARALYSIS VISIT: Performed by: SPEECH-LANGUAGE PATHOLOGIST

## 2017-05-08 PROCEDURE — 92526 ORAL FUNCTION THERAPY: CPT | Mod: GN | Performed by: SPEECH-LANGUAGE PATHOLOGIST

## 2017-05-24 ENCOUNTER — OFFICE VISIT (OUTPATIENT)
Dept: OTOLARYNGOLOGY | Facility: CLINIC | Age: 62
End: 2017-05-24

## 2017-05-24 VITALS — HEIGHT: 69 IN | WEIGHT: 257 LBS | BODY MASS INDEX: 38.06 KG/M2

## 2017-05-24 DIAGNOSIS — G51.39 HEMIFACIAL SPASM: Primary | ICD-10-CM

## 2017-05-24 ASSESSMENT — PAIN SCALES - GENERAL: PAINLEVEL: NO PAIN (0)

## 2017-05-24 NOTE — MR AVS SNAPSHOT
After Visit Summary   5/24/2017    Eran Almeida    MRN: [id number]           Patient Information     Date Of Birth          1955        Visit Information        Provider Department      5/24/2017 3:45 PM Kaci Trevino MD M [facility] Ear Nose and Throat        Today's Diagnoses     Hemifacial spasm    -  1      Care Instructions    Plan of Care:  1. Follow up in 3 weeks.    Sosa Mcintyre RN, [phone]  5/24/2017 4:19 PM            Follow-ups after your visit        Your next 10 appointments already scheduled     Jun 07, 2017  1:45 PM CDT   (Arrive by 1:30 PM)   Return Visit with MD TAMMY uNno [facility] Ear Nose and Throat ([facility])    [address]  4th [facility] [address] [phone]            Jun 12, 2017  1:30 PM CDT   Treatment 45 with Belinda Francisco, SLP   [facility], [place], Speech Therapy - Oupatient ([facility])    [address], [address]  [place] [address] [phone]              Who to contact     Please call your clinic at [phone] to:    Ask questions about your health    Make or cancel appointments    Discuss your medicines    Learn about your test results    Speak to your doctor   If you have compliments or concerns about an experience at your clinic, or if you wish to file a complaint, please contact [facility] Patient Relations at [phone] or email us at [email or web address]         Additional Information About Your Visit        MyChart Information     Netseerhart gives you secure access to your electronic health record. If you see a primary care provider, you can also send messages to your care team and make appointments. If you have questions, please call your primary care clinic.  If you do not have a primary care provider, please call [phone] and they will assist you.      [employer] is an electronic  "gateway that provides easy, online access to your medical records. With TodoCast TV, you can request a clinic appointment, read your test results, renew a prescription or communicate with your care team.     To access your existing account, please contact your [facility] or call [phone] for assistance.        Care EveryWhere ID     This is your Care EveryWhere ID. This could be used by other organizations to access your [place] medical records  [id number]        Your Vitals Were     Height BMI (Body Mass Index)                1.75 m (5' 8.9\") 38.06 kg/m2           Blood Pressure from Last 3 Encounters:   11/21/03 136/78    Weight from Last 3 Encounters:   05/24/17 116.6 kg (257 lb)   01/23/17 108.9 kg (240 lb)   11/21/03 102.1 kg (225 lb)              We Performed the Following     CHEMODENERVATION, FACE NERVE MUSCLE     Xeomin Injection Hemifacial Spasm        Primary Care Provider Office Phone # Fax #    Vinicius Bray [phone]       [facility] [address] [address]        Thank you!     Thank you for choosing [facility] EAR NOSE AND THROAT  for your care. Our goal is always to provide you with excellent care. Hearing back from our patients is one way we can continue to improve our services. Please take a few minutes to complete the written survey that you may receive in the mail after your visit with us. Thank you!             Your Updated Medication List - Protect others around you: Learn how to safely use, store and throw away your medicines at www.disposemymeds.org.          This list is accurate as of: 5/24/17  4:19 PM.  Always use your most recent med list.                   Brand Name Dispense Instructions for use    amantadine 100 MG capsule    SYMMETREL         CRESTOR 40 MG tablet   Generic drug:  rosuvastatin          DHA-EPA-VITAMIN E PO          LIPITOR 20 MG tablet   Generic drug:  atorvastatin      1 tab " PO QD (Once per day)       LORazepam 1 MG tablet    ATIVAN     Take 1 mg by mouth       MULTIVITAMIN TABS   OR      1 po qd       omeprazole 40 MG capsule    priLOSEC         PROZAC 40 MG capsule   Generic drug:  FLUoxetine      1 CAPSULE EVERY MORNING       * venlafaxine 150 MG 24 hr capsule    EFFEXOR-XR     Take 150 mg by mouth       * venlafaxine 75 MG Tb24 24 hr tablet    EFFEXOR-ER         * venlafaxine 150 MG Tb24 24 hr tablet    EFFEXOR-ER         VIAGRA 100 MG cap/tab   Generic drug:  sildenafil          vitamin E 400 UNIT capsule      1 po qd       * Notice:  This list has 3 medication(s) that are the same as other medications prescribed for you. Read the directions carefully, and ask your doctor or other care provider to review them with you.

## 2017-05-24 NOTE — PROGRESS NOTES
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  He has been working with Shannan Francisco and has had significant improvement with the treatment with her.    He is quite pleased.  He has had some recovery of function.  The EMG demonstrates that he has multiple polyphasic action potentials, but does show moderate injury to the facial nerve.      On evaluation today, he has a facial examination that demonstrates a right-sided injury.  He has no brow elevation.  He has full eye closure, but in fact the palpebral aperture is more narrowed on the right compared to the left.  This is the affected side.  He has ocular-oral synkinesis.  When he speaks, he has significant tightening of the right eye.  When he tries to smile, he has complete closure of the right eye.  This synkinesis limits his view and function due to the visual limitation.  He has an eyelid weight in the right upper eyelid.  He has mentalis hypercontracture.  He has over activity of the left depressor.   He is also quite expressive with his brows, and the asymmetry is quite notable with hyperfunction of the left side.  Otherwise, head and neck examination is otherwise benign.           Procedure: Chemodenervation with Botulinum Toxin A  Indication: Hemifacial Spasm and Hypercontracture  Injector: Dr. Kaci Trevino    The skin was cleaned with antimicrobial solution and a topical ice mask was placed.   The patient was asked to systematically engage the muscles in the area to be injected. The tuberculin needles were used for subdermal injection and hemostasis was obtained with digital pressure when needed. The skin was cleaned.     A total of 15 units was injected subdermally. Areas treated were : right periorbital region, right mentalis, left depressor, left brow  Please see scanned procedure log.    The patient tolerated the  procedure well and there were no complications.      Omega presents today with recovery of the facial nerve from his injury; however, this has been delayed and incomplete.  All branches have been re-innervated.  Frontalis is not notable clinically, but is noted on EMG.  He, however, has now suffered the sequellae of recovery which include hemifacial spasm, tightness and synkinesis.  This is primarily notable with the closure of the eye when he moves his lower division of his face.  He would benefit from chemodenervation with botulinum toxin.  I discussed the risks and benefits of the procedure with him at length today.      He is interested in proceeding with the procedure and we performed the procedure today.  Please see the above procedure note.      The patient has discussed that Dr. Collins is considering removal of the platinum chain.  I actually think it will be beneficial to have the Botox on board and working to note what the true dependence is on that chain.   I would defer management of the platinum weights to Dr. Collins.     I spent a total of 30 minutes face-to-face with Eran Almeida during today's office visit.  Over 50% of this time was spent counseling the patient and/or coordinating care regarding his facial paralysis, reviewing the EMG, and performing the procedure.  See note for details.

## 2017-06-07 ENCOUNTER — OFFICE VISIT (OUTPATIENT)
Dept: OTOLARYNGOLOGY | Facility: CLINIC | Age: 62
End: 2017-06-07

## 2017-06-07 VITALS — BODY MASS INDEX: 37.33 KG/M2 | WEIGHT: 252 LBS | HEIGHT: 69 IN

## 2017-06-07 DIAGNOSIS — G51.39 HEMIFACIAL SPASM: ICD-10-CM

## 2017-06-07 DIAGNOSIS — G51.0 FACIAL PARALYSIS: Primary | ICD-10-CM

## 2017-06-07 ASSESSMENT — PAIN SCALES - GENERAL: PAINLEVEL: NO PAIN (0)

## 2017-06-07 NOTE — MR AVS SNAPSHOT
After Visit Summary   6/7/2017    Eran Almeida    MRN: [id number]           Patient Information     Date Of Birth          1955        Visit Information        Provider Department      6/7/2017 1:45 PM Kaci Trevino MD [facility] Ear Nose [facility] Th[facility]        Today's Diagnoses     Facial paralysis    -  1    Hemifacial spasm           Follow-ups after your visit        Your next 10 appointments already scheduled     Jun 12, 2017  1:30 PM CDT   Treatment 45 with Belinda Francisco, SLP   [facility], [place], Speech Therapy - Oupatient ([facility])    [address], [address]  [place] [address]   [phone]              Who to contact     Please call your clinic at [phone] to:    Ask questions about your health    Make or cancel appointments    Discuss your medicines    Learn about your test results    Speak to your doctor   If you have compliments or concerns about an experience at your clinic, or if you wish to file a complaint, please contact [facility] Patient Relations at [phone] or email us at [email or web address]         Additional Information About Your Visit        MyChart Information     RobotsLABt gives you secure access to your electronic health record. If you see a primary care provider, you can also send messages to your care team and make appointments. If you have questions, please call your primary care clinic.  If you do not have a primary care provider, please call [phone] and they will assist you.      Magin is an electronic gateway that provides easy, online access to your medical records. With Magin, you can request a clinic appointment, read your test results, renew a prescription or communicate with your care team.     To access your existing account, please contact your [facility] or call [phone] for assistance.        Care EveryWhere  "ID     This is your Care EveryWhere ID. This could be used by other organizations to access your [place] medical records  [id number]        Your Vitals Were     Height BMI (Body Mass Index)                1.75 m (5' 8.9\") 37.32 kg/m2           Blood Pressure from Last 3 Encounters:   11/21/03 136/78    Weight from Last 3 Encounters:   06/07/17 114.3 kg (252 lb)   05/24/17 116.6 kg (257 lb)   01/23/17 108.9 kg (240 lb)              Today, you had the following     No orders found for display       Primary Care Provider Office Phone # Fax #    Vinicius Bray [phone]       [facility] [address] [address]        Thank you!     Thank you for choosing [facility] EAR NOSE AND THROAT  for your care. Our goal is always to provide you with excellent care. Hearing back from our patients is one way we can continue to improve our services. Please take a few minutes to complete the written survey that you may receive in the mail after your visit with us. Thank you!             Your Updated Medication List - Protect others around you: Learn how to safely use, store and throw away your medicines at www.disposemymeds.org.          This list is accurate as of: 6/7/17  8:24 PM.  Always use your most recent med list.                   Brand Name Dispense Instructions for use    amantadine 100 MG capsule    SYMMETREL         CRESTOR 40 MG tablet   Generic drug:  rosuvastatin          DHA-EPA-VITAMIN E PO          LIPITOR 20 MG tablet   Generic drug:  atorvastatin      1 tab PO QD (Once per day)       LORazepam 1 MG tablet    ATIVAN     Take 1 mg by mouth       MULTIVITAMIN TABS   OR      1 po qd       omeprazole 40 MG capsule    priLOSEC         PROZAC 40 MG capsule   Generic drug:  FLUoxetine      1 CAPSULE EVERY MORNING       * venlafaxine 150 MG 24 hr capsule    EFFEXOR-XR     Take 150 mg by mouth       * venlafaxine 75 MG Tb24 24 hr tablet    EFFEXOR-ER         VIAGRA " 100 MG cap/tab   Generic drug:  sildenafil          vitamin E 400 UNIT capsule      1 po qd       * Notice:  This list has 2 medication(s) that are the same as other medications prescribed for you. Read the directions carefully, and ask your doctor or other care provider to review them with you.

## 2017-06-07 NOTE — LETTER
6/7/2017       RE: Eran Almeida  [address]  [place] [facility] [address]     Dear Colleague,    Thank you for referring your patient, Eran Almeida, to the [facility] EAR NOSE AND THROAT at [facility]. Please see a copy of my visit note below.    Facial Plastic and Reconstructive Surgery    Eran Almeida presents for follow up from his chemodenervation.  He is doing well.    I am happy with the improved eye opening he has as a result and feel that his face is in much more of a relaxed tone. He states that he hasn't noticed the improvement but people in his [place] have.    I would see him again in three months were he to want to continue with treatment.      Again, thank you for allowing me to participate in the care of your patient.      Sincerely,    Kaci Trevino MD

## 2017-06-08 NOTE — PROGRESS NOTES
Facial Plastic and Reconstructive Surgery    Eran Almeida presents for follow up from his chemodenervation.  He is doing well.    I am happy with the improved eye opening he has as a result and feel that his face is in much more of a relaxed tone. He states that he hasn't noticed the improvement but people in his [place] have.    I would see him again in three months were he to want to continue with treatment.

## 2017-06-12 ENCOUNTER — HOSPITAL ENCOUNTER (OUTPATIENT)
Dept: SPEECH THERAPY | Facility: CLINIC | Age: 62
Setting detail: THERAPIES SERIES
End: 2017-06-12
Attending: OTOLARYNGOLOGY
Payer: COMMERCIAL

## 2017-06-12 PROCEDURE — 40000230 ZZH STATISTIC SPEECH FACIAL PARALYSIS VISIT: Performed by: SPEECH-LANGUAGE PATHOLOGIST

## 2017-06-12 PROCEDURE — 92507 TX SP LANG VOICE COMM INDIV: CPT | Mod: GN | Performed by: SPEECH-LANGUAGE PATHOLOGIST

## 2017-06-19 NOTE — ADDENDUM NOTE
Encounter addended by: Belinda Francisco, SLP on: 6/19/2017  2:44 PM<BR>     Actions taken: Flowsheet accepted

## 2017-07-26 ENCOUNTER — AMBULATORY - HEALTHEAST (OUTPATIENT)
Dept: NEUROLOGY | Facility: CLINIC | Age: 62
End: 2017-07-26

## 2017-07-26 DIAGNOSIS — S06.9XAA TBI (TRAUMATIC BRAIN INJURY) (H): ICD-10-CM

## 2017-09-13 ENCOUNTER — OFFICE VISIT (OUTPATIENT)
Dept: OTOLARYNGOLOGY | Facility: CLINIC | Age: 62
End: 2017-09-13

## 2017-09-13 VITALS — BODY MASS INDEX: 37.33 KG/M2 | HEIGHT: 69 IN | WEIGHT: 252 LBS

## 2017-09-13 DIAGNOSIS — G51.39 HEMIFACIAL SPASM: Primary | ICD-10-CM

## 2017-09-13 ASSESSMENT — PAIN SCALES - GENERAL: PAINLEVEL: NO PAIN (0)

## 2017-09-13 NOTE — LETTER
9/13/2017       RE: Eran Almeida  [address] [place] [address]     Dear Colleague,    Thank you for referring your patient, Eran Almeida, to the [facility] EAR NOSE AND THROAT at [facility]. Please see a copy of my visit note below.    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  She has had this treatment in 5/2017 with significant therapeutic effect and improvement and is now 3 months from treatment.  It is timely that she necessitate another treatment with chemodenervation today.     Procedure: Chemodenervation with Botulinum Toxin A  Indication: Hemifacial Spasm and Hypercontracture  Injector: Dr. Kaci Trevino    The skin was cleaned with antimicrobial solution and a topical ice mask was placed.   The patient was asked to systematically engage the muscles in the area to be injected. The tuberculin needles were used for subdermal injection and hemostasis was obtained with digital pressure when needed. The skin was cleaned.     A total of 30 units was injected subdermally. Areas treated were : right hemiface  Please see scanned procedure log.    The patient tolerated the procedure well and there were no complications.      Again, thank you for allowing me to participate in the care of your patient.      Sincerely,    Kaci Trevino MD

## 2017-09-13 NOTE — MR AVS SNAPSHOT
"              After Visit Summary   9/13/2017    Eran Almeida    MRN: [id number]           Patient Information     Date Of Birth          1955        Visit Information        Provider Department      9/13/2017 6:20 PM Kaci Trevino MD [facility] Ear Nose and Throat        Today's Diagnoses     Hemifacial spasm    -  1       Follow-ups after your visit        Who to contact     Please call your clinic at [phone] to:    Ask questions about your health    Make or cancel appointments    Discuss your medicines    Learn about your test results    Speak to your doctor   If you have compliments or concerns about an experience at your clinic, or if you wish to file a complaint, please contact [facility] Patient Relations at [phone] or email us at [email or web address]         Additional Information About Your Visit        MyChart Information     Norset gives you secure access to your electronic health record. If you see a primary care provider, you can also send messages to your care team and make appointments. If you have questions, please call your primary care clinic.  If you do not have a primary care provider, please call [phone] and they will assist you.      Ze[employer] Technology is an electronic gateway that provides easy, online access to your medical records. With Ze[employer] Technology, you can request a clinic appointment, read your test results, renew a prescription or communicate with your care team.     To access your existing account, please contact your [facility] or call [phone] for assistance.        Care EveryWhere ID     This is your Care EveryWhere ID. This could be used by other organizations to access your [place] medical records  [id number]        Your Vitals Were     Height BMI (Body Mass Index)                1.75 m (5' 8.9\") 37.32 kg/m2           Blood Pressure from Last 3 Encounters:   11/21/03 136/78    Weight from Last 3 " Encounters:   09/13/17 114.3 kg (252 lb)   06/07/17 114.3 kg (252 lb)   05/24/17 116.6 kg (257 lb)              We Performed the Following     CHEMODENERVATION, FACE NERVE MUSCLE     Xeomin Injection Hemifacial Spasm        Primary Care Provider Office Phone # Fax #    Vinicius Bray [phone]       [facility] [address] [facility] [address]        Equal Access to Services     [facility] Hospital[facility]YATT : Hadii aad ku hadasho Soomaali, waaxda luqadaha, qaybta kaalmada adeegyada, waxay idiin hayaan adeeg kharash lajulio . So [facility] [phone].    ATENCIÓN: Si habla español, tiene a us disposición servicios gratuitos de asistencia lingüística. [facility] [phone].    We comply with applicable federal civil rights laws and [place] laws. We do not discriminate on the basis of race, color, national origin, age, disability sex, sexual orientation or gender identity.            Thank you!     Thank you for choosing [facility] EAR NOSE AND THROAT  for your care. Our goal is always to provide you with excellent care. Hearing back from our patients is one way we can continue to improve our services. Please take a few minutes to complete the written survey that you may receive in the mail after your visit with us. Thank you!             Your Updated Medication List - Protect others around you: Learn how to safely use, store and throw away your medicines at www.disposemymeds.org.          This list is accurate as of: 9/13/17  8:26 PM.  Always use your most recent med list.                   Brand Name Dispense Instructions for use Diagnosis    amantadine 100 MG capsule    SYMMETREL          CRESTOR 40 MG tablet   Generic drug:  rosuvastatin           DHA-EPA-VITAMIN E PO           LIPITOR 20 MG tablet   Generic drug:  atorvastatin      1 tab PO QD (Once per day)        LORazepam 1 MG tablet    ATIVAN     Take 1 mg by mouth        MULTIVITAMIN TABS   OR      1 po qd        omeprazole 40 MG  capsule    priLOSEC          PROZAC 40 MG capsule   Generic drug:  FLUoxetine      1 CAPSULE EVERY MORNING        * venlafaxine 150 MG 24 hr capsule    EFFEXOR-XR     Take 150 mg by mouth        * venlafaxine 75 MG Tb24 24 hr tablet    EFFEXOR-ER          VIAGRA 100 MG cap/tab   Generic drug:  sildenafil           vitamin E 400 UNIT capsule      1 po qd        * Notice:  This list has 2 medication(s) that are the same as other medications prescribed for you. Read the directions carefully, and ask your doctor or other care provider to review them with you.

## 2017-09-13 NOTE — NURSING NOTE
Chief Complaint   Patient presents with     RECHECK     Botox f/u     Melody Bee Medical Assistant

## 2017-09-14 NOTE — NURSING NOTE
Photo and Video taken today.      Patient was very pleased with last Botulinim Toxin treatment and wishes to proceed with Botulinim Toxin today.    Patient to leanne me in 2 weeks to follow up.    3 month follow up visit scheduled with Dr. Audrey Waller today.    Eliza Fish RN  9/13/2017 7:57 PM

## 2017-09-14 NOTE — PROGRESS NOTES
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  She has had this treatment in 5/2017 with significant therapeutic effect and improvement and is now 3 months from treatment.  It is timely that she necessitate another treatment with chemodenervation today.     Procedure: Chemodenervation with Botulinum Toxin A  Indication: Hemifacial Spasm and Hypercontracture  Injector: Dr. Kaci Trevino    The skin was cleaned with antimicrobial solution and a topical ice mask was placed.   The patient was asked to systematically engage the muscles in the area to be injected. The tuberculin needles were used for subdermal injection and hemostasis was obtained with digital pressure when needed. The skin was cleaned.     A total of 30 units was injected subdermally. Areas treated were : right hemiface  Please see scanned procedure log.    The patient tolerated the procedure well and there were no complications.

## 2017-12-04 ENCOUNTER — HOSPITAL ENCOUNTER (OUTPATIENT)
Dept: SPEECH THERAPY | Facility: CLINIC | Age: 62
Setting detail: THERAPIES SERIES
End: 2017-12-04
Attending: OTOLARYNGOLOGY
Payer: COMMERCIAL

## 2017-12-04 PROCEDURE — 92507 TX SP LANG VOICE COMM INDIV: CPT | Mod: GN | Performed by: SPEECH-LANGUAGE PATHOLOGIST

## 2017-12-04 PROCEDURE — 40000230 ZZH STATISTIC SPEECH FACIAL PARALYSIS VISIT: Performed by: SPEECH-LANGUAGE PATHOLOGIST

## 2017-12-13 ENCOUNTER — OFFICE VISIT (OUTPATIENT)
Dept: OTOLARYNGOLOGY | Facility: CLINIC | Age: 62
End: 2017-12-13
Payer: COMMERCIAL

## 2017-12-13 DIAGNOSIS — G51.39 HEMIFACIAL SPASM: Primary | ICD-10-CM

## 2017-12-13 RX ORDER — CLONAZEPAM 0.5 MG/1
1 TABLET ORAL 2 TIMES DAILY
COMMUNITY
Start: 2017-11-30

## 2017-12-13 ASSESSMENT — PAIN SCALES - GENERAL: PAINLEVEL: NO PAIN (0)

## 2017-12-13 NOTE — NURSING NOTE
Updated photos and video taken today.  Obtained written and verbal consent for Botulinim Toxin.    Patient to return to clinic in 3 months with Dr. Audrey Waller.        PREPROCEDURE:  Botulinim Toxin  Yes Patient ID verified with 2 identifiers (name and  or MRN)  Yes: Procedure and site verified with patient/designee  Yes: Accurate consent documentation in medical record  Yes: Marking not required. Reason: Provider is in continuous attendance with the patient from consent through completion of procedure.     TIME OUT:  Yes: Time-Out performed immediately prior to starting procedure, including verbal and active participation of all team members addressing:  * Correct patient identity  * Confirmed that the correct side and site are marked  * An accurate procedure consent form  * Agreement on the procedure to be done  * Correct patient position  * Relevant images and results are properly labeled and appropriately displayed  * The need to administer antibiotics or fluids for irrigation purposes during the procedure as applicable  * Safety precations based on patient history or medication use    DURING PROCEDURE: Verification of correct person, site, and procedure occurs any time the responsibility for care of the patient is transferred to another member of the care team.               Eliza Fish RN  2017 4:21 PM

## 2017-12-13 NOTE — NURSING NOTE
Chief Complaint   Patient presents with     RECHECK     Return Botox Injection, Pt states no pain today.      N Jian GIRALDO

## 2017-12-13 NOTE — MR AVS SNAPSHOT
After Visit Summary   12/13/2017    Eran Almeida    MRN: [id number]           Patient Information     Date Of Birth          1955        Visit Information        Provider Department      12/13/2017 3:00 PM Kaci Trevino MD [facility] Ear Nose and Throat         Follow-ups after your visit        Your next 10 appointments already scheduled     Dec 27, 2017  3:15 PM CST   Treatment 45 with Belinda Francisco, SLP   [facility] [place], Speech Therapy - Oupatient ([facility])    [address], [address]  [place] [address]   [phone]            Mar 14, 2018  3:20 PM CDT   (Arrive by 3:05 PM)   Return Visit with MD TAMMY N[facility] Ear Nose and Throat ([facility])    [address] [address] [phone]              Who to contact     Please call your clinic at [phone] to:    Ask questions about your health    Make or cancel appointments    Discuss your medicines    Learn about your test results    Speak to your doctor   If you have compliments or concerns about an experience at your clinic, or if you wish to file a complaint, please contact [facility] Patient Relations at [phone] or email us at [email or web address]         Additional Information About Your Visit        MyChart Information     [email or web address] gives you secure access to your electronic health record. If you see a primary care provider, you can also send messages to your care team and make appointments. If you have questions, please call your primary care clinic.  If you do not have a primary care provider, please call [phone] and they will assist you.      [employer] is an electronic gateway that provides easy, online access to your medical records. With [employer], you can request a clinic appointment, read your test results, renew a prescription or communicate with  your care team.     To access your existing account, please contact your [facility] or call [phone] for assistance.        Care EveryWhere ID     This is your Care EveryWhere ID. This could be used by other organizations to access your [place] medical records  [id number]         Blood Pressure from Last 3 Encounters:   11/21/03 136/78    Weight from Last 3 Encounters:   09/13/17 114.3 kg (252 lb)   06/07/17 114.3 kg (252 lb)   05/24/17 116.6 kg (257 lb)              Today, you had the following     No orders found for display         Today's Medication Changes          These changes are accurate as of: 12/13/17  4:10 PM.  If you have any questions, ask your nurse or doctor.               Stop taking these medicines if you haven't already. Please contact your care team if you have questions.     LORazepam 1 MG tablet   Commonly known as:  ATIVAN   Stopped by:  Kaci Trevino MD                    Primary Care Provider Office Phone # Fax #    Vinicius Ian Bray [phone]       [facility] [address] [facility] [address]        Equal Access to Services     [facility]: Hadii aad ku hadasho Soomaali, waaxda luqadaha, qaybta kaalmada adeegyada, waxay daniella haygorann keli griffin ah. So [facility] [phone].    ATENCIÓN: Si habla español, tiene a us disposición servicios gratuitos de asistencia lingüística. Llame al [phone].    We comply with applicable federal civil rights laws and [place] laws. We do not discriminate on the basis of race, color, national origin, age, disability, sex, sexual orientation, or gender identity.            Thank you!     Thank you for choosing [facility] EAR NOSE AND THROAT  for your care. Our goal is always to provide you with excellent care. Hearing back from our patients is one way we can continue to improve our services. Please take a few minutes to complete the written survey that you may  receive in the mail after your visit with us. Thank you!             Your Updated Medication List - Protect others around you: Learn how to safely use, store and throw away your medicines at www.disposemymeds.org.          This list is accurate as of: 12/13/17  4:10 PM.  Always use your most recent med list.                   Brand Name Dispense Instructions for use Diagnosis    amantadine 100 MG capsule    SYMMETREL          clonazePAM 0.5 MG tablet    klonoPIN     Take one tab up to two times daily as needed for anxiety and 2 tabs at bedtime. May fill on or after 11/30/17. Next appointment 2/5/18        CRESTOR 40 MG tablet   Generic drug:  rosuvastatin           DHA-EPA-VITAMIN E PO           LIPITOR 20 MG tablet   Generic drug:  atorvastatin      1 tab PO QD (Once per day)        MULTIVITAMIN TABS   OR      1 po qd        omeprazole 40 MG capsule    priLOSEC          * venlafaxine 150 MG 24 hr capsule    EFFEXOR-XR     Take 150 mg by mouth        * venlafaxine 75 MG Tb24 24 hr tablet    EFFEXOR-ER          VIAGRA 100 MG tablet   Generic drug:  sildenafil           vitamin E 400 UNIT capsule      1 po qd        * Notice:  This list has 2 medication(s) that are the same as other medications prescribed for you. Read the directions carefully, and ask your doctor or other care provider to review them with you.

## 2017-12-13 NOTE — PROGRESS NOTES
I had to pleasure of seeing Omega Almeida today in clinic for consultation for facial weakness.  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   He was evaluated and treated at [facility] where Dr. 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      He had complete right sided facial paralysis at the time of injury. He notes that he began to have the return of function 2-3 months ago and believes it is still gradually improving.  He has not had an EMG to assess his facial function. He does describe hearing loss on the right side, had an initial audiogram but not one since. He believes his hearing to be improved also.  He denies any imbalance.

## 2017-12-13 NOTE — LETTER
12/13/2017       RE: Eran Almeida  [address]  [place] [facility] [address]     Dear Colleague,    Thank you for referring your patient, Eran Almeida, to the [facility] EAR NOSE AND THROAT at [facility]. Please see a copy of my visit note below.    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  She has had this treatment in september with significant therapeutic effect and improvement and is now 3 months from treatment.  It is timely that she necessitate another treatment with chemodenervation today.     Procedure: Chemodenervation with Botulinum Toxin A  Indication: Hemifacial Spasm and Hypercontracture  Injector: Dr. Kaci Trevino    The skin was cleaned with antimicrobial solution and a topical ice mask was placed.   The patient was asked to systematically engage the muscles in the area to be injected. The tuberculin needles were used for subdermal injection and hemostasis was obtained with digital pressure when needed. The skin was cleaned.     A total of 30 units was injected subdermally.  Please see scanned procedure log.    The patient tolerated the procedure well and there were no complications.      I had to pleasure of seeing Omega Almeida today in clinic for consultation for facial weakness.  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   He was evaluated and treated at [facility] where Dr. 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      He had complete right sided facial paralysis at the time of injury. He notes that he began to have the return of function 2-3 months ago and believes it is still gradually improving.  He has not had an EMG to assess his facial function. He does describe hearing loss on the  right side, had an initial audiogram but not one since. He believes his hearing to be improved also.  He denies any imbalance.     Again, thank you for allowing me to participate in the care of your patient.      Sincerely,    Kaci Trevino MD

## 2017-12-13 NOTE — PROGRESS NOTES
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  She has had this treatment in september with significant therapeutic effect and improvement and is now 3 months from treatment.  It is timely that she necessitate another treatment with chemodenervation today.     Procedure: Chemodenervation with Botulinum Toxin A  Indication: Hemifacial Spasm and Hypercontracture  Injector: Dr. Kaci Trevino    The skin was cleaned with antimicrobial solution and a topical ice mask was placed.   The patient was asked to systematically engage the muscles in the area to be injected. The tuberculin needles were used for subdermal injection and hemostasis was obtained with digital pressure when needed. The skin was cleaned.     A total of 30 units was injected subdermally.  Please see scanned procedure log.    The patient tolerated the procedure well and there were no complications.

## 2018-03-14 ENCOUNTER — OFFICE VISIT (OUTPATIENT)
Dept: OTOLARYNGOLOGY | Facility: CLINIC | Age: 63
End: 2018-03-14
Payer: COMMERCIAL

## 2018-03-14 VITALS — WEIGHT: 264 LBS | BODY MASS INDEX: 39.1 KG/M2 | HEIGHT: 69 IN

## 2018-03-14 DIAGNOSIS — G51.39 HEMIFACIAL SPASM: Primary | ICD-10-CM

## 2018-03-14 DIAGNOSIS — G24.5 BLEPHAROSPASM: ICD-10-CM

## 2018-03-14 PROBLEM — F41.8 OTHER SPECIFIED ANXIETY DISORDERS: Status: ACTIVE | Noted: 2017-11-03

## 2018-03-14 ASSESSMENT — PAIN SCALES - GENERAL: PAINLEVEL: NO PAIN (0)

## 2018-03-14 NOTE — MR AVS SNAPSHOT
"              After Visit Summary   3/14/2018    Eran Almeida    MRN: [id number]           Patient Information     Date Of Birth          1955        Visit Information        Provider Department      3/14/2018 3:20 PM Kaci Trevino MD [facility] Ear Nose and Throat        Today's Diagnoses     Hemifacial spasm    -  1    Blepharospasm           Follow-ups after your visit        Who to contact     Please call your clinic at [phone] to:    Ask questions about your health    Make or cancel appointments    Discuss your medicines    Learn about your test results    Speak to your doctor            Additional Information About Your Visit        MyChart Information     [employer] gives you secure access to your electronic health record. If you see a primary care provider, you can also send messages to your care team and make appointments. If you have questions, please call your primary care clinic.  If you do not have a primary care provider, please call [phone] and they will assist you.      [employer] is an electronic gateway that provides easy, online access to your medical records. With [employer], you can request a clinic appointment, read your test results, renew a prescription or communicate with your care team.     To access your existing account, please contact your [facility] or call [phone] for assistance.        Care EveryWhere ID     This is your Care EveryWhere ID. This could be used by other organizations to access your [place] medical records  [id number]        Your Vitals Were     Height BMI (Body Mass Index)                1.74 m (5' 8.5\") 39.55 kg/m2           Blood Pressure from Last 3 Encounters:   11/21/03 136/78    Weight from Last 3 Encounters:   03/14/18 119.7 kg (264 lb)   09/13/17 114.3 kg (252 lb)   06/07/17 114.3 kg (252 lb)              We Performed the Following     CHEMODENERVATION, FACE NERVE MUSCLE     Xeomin Injection Hemifacial Spasm     "    Primary Care Provider Office Phone # Fax #    Vinicius Bray [phone]       [facility] [address] [facility] [address] [facility] [place] [address]        Equal Access to Services     PREM GRACIA : Sherry butler ku chado Sorosiali, waaxda luqadaha, qaybta kaalmada adeegyada, zurdo gradyn keli medley laBrandonlelia godwin. So [facility] [phone].    ATENCIÓN: Si habla español, tiene a us disposición servicios gratuitos de asistencia lingüística. Llame al [phone].    We comply with applicable federal civil rights laws and [place] laws. We do not discriminate on the basis of race, color, national origin, age, disability, sex, sexual orientation, or gender identity.            Thank you!     Thank you for choosing [facility] EAR NOSE AND THROAT  for your care. Our goal is always to provide you with excellent care. Hearing back from our patients is one way we can continue to improve our services. Please take a few minutes to complete the written survey that you may receive in the mail after your visit with us. Thank you!             Your Updated Medication List - Protect others around you: Learn how to safely use, store and throw away your medicines at www.disposemymeds.org.          This list is accurate as of 3/14/18  3:58 PM.  Always use your most recent med list.                   Brand Name Dispense Instructions for use Diagnosis    amantadine 100 MG capsule    SYMMETREL          clonazePAM 0.5 MG tablet    klonoPIN     Take one tab up to two times daily as needed for anxiety and 2 tabs at bedtime. May fill on or after 11/30/17. Next appointment 2/5/18        DHA-EPA-VITAMIN E PO           * LIPITOR PO           * LIPITOR 20 MG tablet   Generic drug:  atorvastatin      1 tab PO QD (Once per day)        MULTIVITAMIN TABS   OR      1 po qd        omeprazole 40 MG capsule    priLOSEC          VIAGRA 100 MG tablet   Generic drug:  sildenafil           vitamin E 400 UNIT capsule      1 po qd         * Notice:  This list has 2 medication(s) that are the same as other medications prescribed for you. Read the directions carefully, and ask your doctor or other care provider to review them with you.

## 2018-03-14 NOTE — PROGRESS NOTES
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  She has had this treatment in December with significant therapeutic effect and improvement and is now 3 months from treatment.  It is timely that she necessitate another treatment with chemodenervation today.     Procedure: Chemodenervation with Botulinum Toxin A  Indication: Hemifacial Spasm and Hypercontracture, blepharospasm  Injector: Dr. Kaci Trevino    The skin was cleaned with antimicrobial solution and a topical ice mask was placed.   The patient was asked to systematically engage the muscles in the area to be injected. The tuberculin needles were used for subdermal injection and hemostasis was obtained with digital pressure when needed. The skin was cleaned.     A total of 39 units was injected subdermally.   Please see scanned procedure log.    The patient tolerated the procedure well and there were no complications.

## 2018-03-14 NOTE — NURSING NOTE
"Chief Complaint   Patient presents with     Procedure     Botox     Height 1.74 m (5' 8.5\"), weight 119.7 kg (264 lb).    Brian Angulo LPN    "

## 2018-03-14 NOTE — LETTER
3/14/2018     RE: Eran Almeida  [address] [facility] [address]     Dear Colleague,    Thank you for referring your patient, Eran Almeida, to the [facility] EAR NOSE AND TH[facility]AT at [facility]. Please see a copy of my visit note below.    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  She has had this treatment in December with significant therapeutic effect and improvement and is now 3 months from treatment.  It is timely that she necessitate another treatment with chemodenervation today.     Procedure: Chemodenervation with Botulinum Toxin A  Indication: Hemifacial Spasm and Hypercontracture, blepharospasm  Injector: Dr. Kaci Trevino    The skin was cleaned with antimicrobial solution and a topical ice mask was placed.   The patient was asked to systematically engage the muscles in the area to be injected. The tuberculin needles were used for subdermal injection and hemostasis was obtained with digital pressure when needed. The skin was cleaned.     A total of 39 units was injected subdermally.   Please see scanned procedure log.    The patient tolerated the procedure well and there were no complications.    Again, thank you for allowing me to participate in the care of your patient.      Sincerely,  Kaci Trevino MD

## 2018-03-14 NOTE — NURSING NOTE
Updated photos taken.  Obtained written and verbal consent for Botulinum Toxin.  See Narayan Tab for Treatment Sheet.        PREPROCEDURE: Botulinum Toxin  Yes Patient ID verified with 2 identifiers (name and  or MRN)  Yes: Procedure and site verified with patient/designee  Yes: Accurate consent documentation in medical record  Yes: Marking not required. Reason: Provider is in continuous attendance with the patient from consent through completion of procedure.     TIME OUT:  Yes: Time-Out performed immediately prior to starting procedure, including verbal and active participation of all team members addressing:  * Correct patient identity  * Confirmed that the correct side and site are marked  * An accurate procedure consent form  * Agreement on the procedure to be done  * Correct patient position  * Relevant images and results are properly labeled and appropriately displayed  * The need to administer antibiotics or fluids for irrigation purposes during the procedure as applicable  * Safety precations based on patient history or medication use    DURING PROCEDURE: Verification of correct person, site, and procedure occurs any time the responsibility for care of the patient is transferred to another member of the care team.               Eliza Fish RN  3/14/2018 4:18 PM

## 2018-06-20 ENCOUNTER — OFFICE VISIT (OUTPATIENT)
Dept: OTOLARYNGOLOGY | Facility: CLINIC | Age: 63
End: 2018-06-20
Payer: COMMERCIAL

## 2018-06-20 VITALS — WEIGHT: 265 LBS | HEIGHT: 69 IN | BODY MASS INDEX: 39.25 KG/M2

## 2018-06-20 DIAGNOSIS — G51.39 HEMIFACIAL SPASM: Primary | ICD-10-CM

## 2018-06-20 RX ORDER — DEXTROAMPHETAMINE SACCHARATE, AMPHETAMINE ASPARTATE MONOHYDRATE, DEXTROAMPHETAMINE SULFATE AND AMPHETAMINE SULFATE 5; 5; 5; 5 MG/1; MG/1; MG/1; MG/1
20 CAPSULE, EXTENDED RELEASE ORAL DAILY
COMMUNITY
End: 2021-06-03

## 2018-06-20 ASSESSMENT — PAIN SCALES - GENERAL: PAINLEVEL: NO PAIN (0)

## 2018-06-20 NOTE — PROGRESS NOTES
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  He has had this treatment in March with significant therapeutic effect and improvement and is now 3 months from treatment.  It is timely that he necessitate another treatment with chemodenervation today.     He was pleased with the dosing provided and the results.    Procedure: Chemodenervation with Botulinum Toxin A  Indication: Hemifacial Spasm and Hypercontracture  Injector: Dr. Kaci Trevino    The skin was cleaned with antimicrobial solution and a topical ice mask was placed.   The patient was asked to systematically engage the muscles in the area to be injected. The tuberculin needles were used for subdermal injection and hemostasis was obtained with digital pressure when needed. The skin was cleaned.     A total of 39 units was injected subdermally.   Please see scanned procedure log.    The patient tolerated the procedure well and there were no complications.

## 2018-06-20 NOTE — MR AVS SNAPSHOT
"              After Visit Summary   6/20/2018    Eran Almeida    MRN: [id number]           Patient Information     Date Of Birth          1955        Visit Information        Provider Department      6/20/2018 12:00 PM Kaci Trevino MD [facility] Ear Nose and Throat        Today's Diagnoses     Hemifacial spasm    -  1       Follow-ups after your visit        Who to contact     Please call your clinic at [phone] to:    Ask questions about your health    Make or cancel appointments    Discuss your medicines    Learn about your test results    Speak to your doctor            Additional Information About Your Visit        MyChart Information     [employer] gives you secure access to your electronic health record. If you see a primary care provider, you can also send messages to your care team and make appointments. If you have questions, please call your primary care clinic.  If you do not have a primary care provider, please call [phone] and they will assist you.      [employer] is an electronic gateway that provides easy, online access to your medical records. With [employer], you can request a clinic appointment, read your test results, renew a prescription or communicate with your care team.     To access your existing account, please contact your [facility] or call [phone] for assistance.        Care EveryWhere ID     This is your Care EveryWhere ID. This could be used by other organizations to access your [place] medical records  [id number]        Your Vitals Were     Height BMI (Body Mass Index)                1.74 m (5' 8.5\") 39.7 kg/m2           Blood Pressure from Last 3 Encounters:   11/21/03 136/78    Weight from Last 3 Encounters:   06/20/18 120.2 kg (265 lb)   03/14/18 119.7 kg (264 lb)   09/13/17 114.3 kg (252 lb)              We Performed the Following     BOTULINUM TOXIN A PER UNIT     CHEMODENERVATION, FACE NERVE MUSCLE        Primary Care Provider " Office Phone # Fax #    Vinicius Bray [phone]       [facility] [address] [facility] [place] [address]        Equal Access to Services     PREM GRACIA : Hadii luke ku hadashisho Soomaali, waaxda luqadaha, qaybta kaalmada adeegyada, zurdo gradyn keli medley laBrandonlelia godwin. So [facility] [phone].    ATENCIÓN: Si habla español, tiene a us disposición servicios gratuitos de asistencia lingüística. Llame al [phone].    We comply with applicable federal civil rights laws and [place] laws. We do not discriminate on the basis of race, color, national origin, age, disability, sex, sexual orientation, or gender identity.            Thank you!     Thank you for choosing [facility] EAR NOSE AND THROAT  for your care. Our goal is always to provide you with excellent care. Hearing back from our patients is one way we can continue to improve our services. Please take a few minutes to complete the written survey that you may receive in the mail after your visit with us. Thank you!             Your Updated Medication List - Protect others around you: Learn how to safely use, store and throw away your medicines at www.disposemymeds.org.          This list is accurate as of 6/20/18  2:44 PM.  Always use your most recent med list.                   Brand Name Dispense Instructions for use Diagnosis    amantadine 100 MG capsule    SYMMETREL          amphetamine-dextroamphetamine 20 MG per 24 hr capsule    ADDERALL XR     Take 20 mg by mouth daily        clonazePAM 0.5 MG tablet    klonoPIN     Take one tab up to two times daily as needed for anxiety and 2 tabs at bedtime. May fill on or after 11/30/17. Next appointment 2/5/18        DHA-EPA-VITAMIN E PO           * LIPITOR PO           * LIPITOR 20 MG tablet   Generic drug:  atorvastatin      1 tab PO QD (Once per day)        MULTIVITAMIN TABS   OR      1 po qd        omeprazole 40 MG capsule    priLOSEC          VIAGRA 100 MG tablet    Generic drug:  sildenafil           vitamin E 400 UNIT capsule      1 po qd        * Notice:  This list has 2 medication(s) that are the same as other medications prescribed for you. Read the directions carefully, and ask your doctor or other care provider to review them with you.

## 2018-06-20 NOTE — LETTER
6/20/2018       RE: Eran Almeida  [address]  [facility] [address]     Dear Colleague,    Thank you for referring your patient, Eran Almeida, to the [facility] EAR NOSE AND THROAT at [facility]. Please see a copy of my visit note below.    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  He has had this treatment in March with significant therapeutic effect and improvement and is now 3 months from treatment.  It is timely that he necessitate another treatment with chemodenervation today.     He was pleased with the dosing provided and the results.    Procedure: Chemodenervation with Botulinum Toxin A  Indication: Hemifacial Spasm and Hypercontracture  Injector: Dr. Kaci Trevino    The skin was cleaned with antimicrobial solution and a topical ice mask was placed.   The patient was asked to systematically engage the muscles in the area to be injected. The tuberculin needles were used for subdermal injection and hemostasis was obtained with digital pressure when needed. The skin was cleaned.     A total of 39 units was injected subdermally.   Please see scanned procedure log.    The patient tolerated the procedure well and there were no complications.      Again, thank you for allowing me to participate in the care of your patient.      Sincerely,    Kaci Trevino MD

## 2018-06-20 NOTE — NURSING NOTE
Chief Complaint   Patient presents with     RECHECK     3 months botox injection      Jamshid Chambers

## 2018-10-26 ENCOUNTER — PATIENT OUTREACH (OUTPATIENT)
Dept: CARE COORDINATION | Facility: CLINIC | Age: 63
End: 2018-10-26

## 2018-10-29 ENCOUNTER — TELEPHONE (OUTPATIENT)
Dept: OTOLARYNGOLOGY | Facility: CLINIC | Age: 63
End: 2018-10-29

## 2018-11-07 ENCOUNTER — OFFICE VISIT (OUTPATIENT)
Dept: OTOLARYNGOLOGY | Facility: CLINIC | Age: 63
End: 2018-11-07
Payer: COMMERCIAL

## 2018-11-07 DIAGNOSIS — G51.39 HEMIFACIAL SPASM: Primary | ICD-10-CM

## 2018-11-07 NOTE — MR AVS SNAPSHOT
After Visit Summary   11/7/2018    Eran Almeida    MRN: [id number]           Patient Information     Date Of Birth          1955        Visit Information        Provider Department      11/7/2018 11:00 AM Kaci Trevino MD [facility] Ear Nose and Throat        Today's Diagnoses     Hemifacial spasm    -  1       Follow-ups after your visit        Who to contact     Please call your clinic at [phone] to:    Ask questions about your health    Make or cancel appointments    Discuss your medicines    Learn about your test results    Speak to your doctor            Additional Information About Your Visit        MyChart Information     [employer] gives you secure access to your electronic health record. If you see a primary care provider, you can also send messages to your care team and make appointments. If you have questions, please call your primary care clinic.  If you do not have a primary care provider, please call [phone] and they will assist you.      [employer] is an electronic gateway that provides easy, online access to your medical records. With [employer], you can request a clinic appointment, read your test results, renew a prescription or communicate with your care team.     To access your existing account, please contact your [facility] or call [phone] for assistance.        Care EveryWhere ID     This is your Care EveryWhere ID. This could be used by other organizations to access your [facility] medical records  [id number]         Blood Pressure from Last 3 Encounters:   11/21/03 136/78    Weight from Last 3 Encounters:   06/20/18 120.2 kg (265 lb)   03/14/18 119.7 kg (264 lb)   09/13/17 114.3 kg (252 lb)              We Performed the Following     Botox Injection For Neck Muscle     CHEMODENERVATION, FACE NERVE MUSCLE     Xeomin Injection Hemifacial Spasm        Primary Care Provider Office Phone # Fax #    Vinicius Bray MD  [phone]       [facility] [address]  [place] [address]        Equal Access to Services     LIBANSANCHEZ SAMIA : Sherry luke ovalles leonard Sokristi, waezioda luqyasmeen, qaybta kaalmada izzy, zurdo gamezgonzalo tato. So [facility] [phone].    ATENCIÓN: Si habla español, tiene a us disposición servicios gratuitos de asistencia lingüística. Llame al [phone].    We comply with applicable federal civil rights laws and [place] laws. We do not discriminate on the basis of race, color, national origin, age, disability, sex, sexual orientation, or gender identity.            Thank you!     Thank you for choosing [facility] EAR NOSE AND THROAT  for your care. Our goal is always to provide you with excellent care. Hearing back from our patients is one way we can continue to improve our services. Please take a few minutes to complete the written survey that you may receive in the mail after your visit with us. Thank you!             Your Updated Medication List - Protect others around you: Learn how to safely use, store and throw away your medicines at www.disposemymeds.org.          This list is accurate as of 11/7/18 12:40 PM.  Always use your most recent med list.                   Brand Name Dispense Instructions for use Diagnosis    amantadine 100 MG capsule    SYMMETREL          amphetamine-dextroamphetamine 20 MG per 24 hr capsule    ADDERALL XR     Take 20 mg by mouth daily        clonazePAM 0.5 MG tablet    klonoPIN     Take one tab up to two times daily as needed for anxiety and 2 tabs at bedtime. May fill on or after 11/30/17. Next appointment 2/5/18        DHA-EPA-VITAMIN E PO           * LIPITOR PO           * LIPITOR 20 MG tablet   Generic drug:  atorvastatin      1 tab PO QD (Once per day)        MULTIVITAMIN TABS   OR      1 po qd        omeprazole 40 MG capsule    priLOSEC          VIAGRA 100 MG tablet   Generic drug:  sildenafil           vitamin E 400 UNIT  capsule      1 po qd        * Notice:  This list has 2 medication(s) that are the same as other medications prescribed for you. Read the directions carefully, and ask your doctor or other care provider to review them with you.

## 2018-11-07 NOTE — PROGRESS NOTES
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  She has had this treatment in June with significant therapeutic effect and improvement and is now 4 months from treatment.  It is timely that she necessitate another treatment with chemodenervation today.     Procedure: Chemodenervation with Botulinum Toxin A  Indication: Hemifacial Spasm and Hypercontracture  Injector: Dr. Kaci Trevino    The skin was cleaned with antimicrobial solution and a topical ice mask was placed.   The patient was asked to systematically engage the muscles in the area to be injected. The tuberculin needles were used for subdermal injection and hemostasis was obtained with digital pressure when needed. The skin was cleaned.     A total of 40  units was injected subdermally.   Please see scanned procedure log.    The patient tolerated the procedure well and there were no complications.

## 2018-11-07 NOTE — NURSING NOTE
Chief Complaint   Patient presents with     RECHECK     botox     There were no vitals taken for this visit.    Armond Thompson

## 2018-11-07 NOTE — LETTER
11/7/2018       RE: Eran Almeida  [address] [facility] [address]     Dear Colleague,    Thank you for referring your patient, Eran Almeida, to the [facility] EAR NOSE AND THROAT at [facility]. Please see a copy of my visit note below.    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  She has had this treatment in June with significant therapeutic effect and improvement and is now 4 months from treatment.  It is timely that she necessitate another treatment with chemodenervation today.     Procedure: Chemodenervation with Botulinum Toxin A  Indication: Hemifacial Spasm and Hypercontracture  Injector: Dr. Kaci Trevino    The skin was cleaned with antimicrobial solution and a topical ice mask was placed.   The patient was asked to systematically engage the muscles in the area to be injected. The tuberculin needles were used for subdermal injection and hemostasis was obtained with digital pressure when needed. The skin was cleaned.     A total of 40  units was injected subdermally.   Please see scanned procedure log.    The patient tolerated the procedure well and there were no complications.      Again, thank you for allowing me to participate in the care of your patient.      Sincerely,    Kaci Trevino MD

## 2019-02-13 ENCOUNTER — OFFICE VISIT (OUTPATIENT)
Dept: OTOLARYNGOLOGY | Facility: CLINIC | Age: 64
End: 2019-02-13
Payer: COMMERCIAL

## 2019-02-13 DIAGNOSIS — G51.39 HEMIFACIAL SPASM: Primary | ICD-10-CM

## 2019-02-13 NOTE — NURSING NOTE
Photodocumentation obtained.  Follow up with Dr. Audrey Waller in 3 months.    Botox tolerated well - see media tab for treatment sheet.    Eliza Fish RN  2/13/2019 3:56 PM

## 2019-02-13 NOTE — LETTER
2/13/2019       RE: Eran Almeida  [address]  [facility] [address]     Dear Colleague,    Thank you for referring your patient, Eran Almeida, to the [facility] EAR NOSE AND THROAT at [facility]. Please see a copy of my visit note below.    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  He has had this treatment in the past with significant therapeutic effect and improvement and is now 3 months from treatment.  It is timely that she necessitate another treatment with chemodenervation today.     Procedure: Chemodenervation with Botulinum Toxin A  Indication: Hemifacial Spasm and Hypercontracture  Injector: Dr. Kaci Trevino    The skin was cleaned with antimicrobial solution and a topical ice mask was placed.   The patient was asked to systematically engage the muscles in the area to be injected. The tuberculin needles were used for subdermal injection and hemostasis was obtained with digital pressure when needed. The skin was cleaned.     A total of 40 units was injected subdermally.   Please see scanned procedure log.    The patient tolerated the procedure well and there were no complications.      Again, thank you for allowing me to participate in the care of your patient.      Sincerely,    Kaci Trevino MD

## 2019-02-13 NOTE — PROGRESS NOTES
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  He has had this treatment in the past with significant therapeutic effect and improvement and is now 3 months from treatment.  It is timely that she necessitate another treatment with chemodenervation today.     Procedure: Chemodenervation with Botulinum Toxin A  Indication: Hemifacial Spasm and Hypercontracture  Injector: Dr. Kaci Trevino    The skin was cleaned with antimicrobial solution and a topical ice mask was placed.   The patient was asked to systematically engage the muscles in the area to be injected. The tuberculin needles were used for subdermal injection and hemostasis was obtained with digital pressure when needed. The skin was cleaned.     A total of 40 units was injected subdermally.   Please see scanned procedure log.    The patient tolerated the procedure well and there were no complications.

## 2019-04-04 ENCOUNTER — DOCUMENTATION ONLY (OUTPATIENT)
Dept: CARE COORDINATION | Facility: CLINIC | Age: 64
End: 2019-04-04

## 2019-05-15 ENCOUNTER — OFFICE VISIT (OUTPATIENT)
Dept: OTOLARYNGOLOGY | Facility: CLINIC | Age: 64
End: 2019-05-15
Payer: COMMERCIAL

## 2019-05-15 DIAGNOSIS — G51.39 HEMIFACIAL SPASM: Primary | ICD-10-CM

## 2019-05-15 NOTE — PROGRESS NOTES
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 Treatment with physical therapy has been maximized and the patient is referred for chemodenervation with botulinum toxin, The risks and benefits of the procedure were discussed.      Procedure: Chemodenervation with Botulinum Toxin A  Indication: Hemifacial Spasm and Hypercontracture  Injector: Kaci Trevino      Informed consent was obtained.  The skin was cleaned with antimicrobial solution and a topical ice was placed.     The patient was asked to systematically engage the muscles in the area to be injected. The tuberculin needles were used for subdermal injection and hemostasis was obtained with light digital pressure when needed. The skin was cleaned.     A total of 45 units were injected.      Please see procedure log.    The patient tolerated the procedure well and there were no complications. Post procedure care instructions were given to the patient.

## 2019-05-15 NOTE — LETTER
5/15/2019       RE: Eran Almeida  [address]  [facility] [address]     Dear Colleague,    Thank you for referring your patient, Eran Almeida, to the [facility] EAR NOSE AND THROAT at [facility]. Please see a copy of my visit note below.    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 Treatment with physical therapy has been maximized and the patient is referred for chemodenervation with botulinum toxin, The risks and benefits of the procedure were discussed.      Procedure: Chemodenervation with Botulinum Toxin A  Indication: Hemifacial Spasm and Hypercontracture  Injector: Kaci Trevino      Informed consent was obtained.  The skin was cleaned with antimicrobial solution and a topical ice was placed.     The patient was asked to systematically engage the muscles in the area to be injected. The tuberculin needles were used for subdermal injection and hemostasis was obtained with light digital pressure when needed. The skin was cleaned.     A total of 45 units were injected.      Please see procedure log.    The patient tolerated the procedure well and there were no complications. Post procedure care instructions were given to the patient.      Again, thank you for allowing me to participate in the care of your patient.      Sincerely,    Kaci Trevino MD

## 2019-09-10 ENCOUNTER — TRANSFERRED RECORDS (OUTPATIENT)
Dept: HEALTH INFORMATION MANAGEMENT | Facility: CLINIC | Age: 64
End: 2019-09-10

## 2019-09-12 ENCOUNTER — TELEPHONE (OUTPATIENT)
Dept: OTOLARYNGOLOGY | Facility: CLINIC | Age: 64
End: 2019-09-12

## 2019-09-12 NOTE — TELEPHONE ENCOUNTER
[facility] Call Center    Phone Message    May a detailed message be left on voicemail: yes    Reason for Call: Other: Patients wife is calling to schedule follow up botox injection, there are scheduling issues and she would like to speak with the provider regarding this, please call to discuss thank you     Action Taken: Message routed to:  [facility] ENT

## 2019-09-17 ENCOUNTER — TELEPHONE (OUTPATIENT)
Dept: BEHAVIORAL HEALTH | Facility: CLINIC | Age: 64
End: 2019-09-17

## 2019-09-17 NOTE — TELEPHONE ENCOUNTER
9-17-19 Recv'd 18 page fax from [facility]/Susie ESCOBAR [phone] referring client to [facility] DA.  (Dr Bray,Vinicius STRANGE)    Requested [email or web address] fb

## 2019-09-18 NOTE — TELEPHONE ENCOUNTER
[facility] Call [facility]    Phone Message    May a detailed message be left on voicemail: yes    Reason for Call: Other: Patient's called said she wanted to see if her  can get in for his botox injections in [place]. Patient is off every Tuesday she works on Wednesday and Thursday she has every other Monday and Friday off. She said he next option to bring the pt would be September 24th please call to discuss as soon as possible.     Action Taken: Other: ump ENT

## 2019-09-23 ENCOUNTER — TELEPHONE (OUTPATIENT)
Dept: PLASTIC SURGERY | Facility: CLINIC | Age: 64
End: 2019-09-23

## 2019-09-23 ENCOUNTER — TELEPHONE (OUTPATIENT)
Dept: OTOLARYNGOLOGY | Facility: CLINIC | Age: 64
End: 2019-09-23

## 2019-09-23 NOTE — TELEPHONE ENCOUNTER
[facility] Call Center    Phone Message    May a detailed message be left on voicemail: no    Reason for Call: Other: Patients wife following up on previous message about scheduling. Would like care team to give her a call back. Please advise.      Action Taken: Message routed to:  [facility] ENT

## 2019-09-24 ENCOUNTER — HOSPITAL ENCOUNTER (OUTPATIENT)
Dept: BEHAVIORAL HEALTH | Facility: CLINIC | Age: 64
End: 2019-09-24
Attending: PSYCHIATRY & NEUROLOGY
Payer: COMMERCIAL

## 2019-09-24 RX ORDER — OLANZAPINE 5 MG/1
5 TABLET ORAL 3 TIMES DAILY
COMMUNITY

## 2019-09-24 NOTE — PROGRESS NOTES
"   Standard Diagnostic Assessment     CLIENT'S NAME: Earn Almeida  MRN:   [id number]  :   1955 AGE:64 year old SEX: male  ACCT. NUMBER: [id number]  DATE OF SERVICE: 19 Start Time:  1245 End Time:  1430    Home Phone [phone]   Work Phone Not on file.   Mobile [phone]     Preferred Phone: linda  May we leave a program related message? yes    Yes, the patient has been informed that any other mental health professional providing mental health services to me will need access to this Diagnostic Assessment in order to develop a treatment plan and receive payment.     Identifying Information:  Eran Almeida is a 64 year old, Choose not to answer,  male. Eran attended the   alone.     Reason for Referral: Eran was referred to 55+ Outpatient Program (55+) by [facility]. Eran reports the reason for referral at this time is depression.  He reports he has hyperventilation episodes that can last a whole day.  He said he has rapid breathing and feels anxious. He said he gets up at 4:30 or 5: each morning and the anxiety starts right away.   He said he it is from his brain.  He said he has depression.  He said he his wife works 3 days a week and he has internal depression.  He said \"I do not know how to explain it\".  He said he is quiet when his wife is not there.  He said he has hard time talking due to his brain injury.  He said he does attend a group at [facility] once per week.  He said he was on Adderall and \"they took me off\" but did not know why.  He wanted to know if a neurosurgeon was in the group because he wants to get his face fixed \"client pointed to right cheek\".  When asked why he said \"so I can talk\".  Said he volunteers a lot at [facility].  He said he takes a lot of pictures and enjoys photography.  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    Writer spoke to the referral source, Dr. Burns's nurse, regarding his presentation in the appointment " "today and she reports he comes to the office and presents differently.  She said he will come in with depressed mood, tearfulness, and increased anxiety.  She said he reports suicidal thoughts.  She said when they talk to him he seems lucid and has a good fund of knowledge. Writer explained the results of the MOCA 11/30 and suggested the client may feel overwhelmed with the programming in the program if he having significant memory problems.  Writer offered that the program only wants what will fit best for the client and there are some concerns the program may not be able to meet his needs as his depression may fluctuate with organic difficulties from the TBI    Eran verbalizes the following treatment/discharge goals:  \"wants to keep busy and make friends\".    Current Stressors/Losses/Disappointments: client reports his wife works 3 days a week and that is stressful for him because she is gone and he does not know what to do with himself.    Per Client, Review of Symptoms:  Mood (Depression/Anxiety/Hilary/Anger): depression; cannot describe,  Anxiety; hyperventilation, feels anxious  Thoughts: denies, difficulty with word finding, hard time putting together my thoughts,   Concentration/Memory: memory is \"sketchy\"  He said he forgets small things often  Appetite/Weight: (see also, Physical Health Screening below) no concerns  Sleep: some times will have a hard time falling asleep, most nights sleeps 8 hours    Motivation/Energy: denies problem  Behavior:   Said Wife \"does not like it when I get angry and I do that occasionally\"    Psychosis: denies    Trauma: denies  Other: denies    Mental Health History:  Eran reports first onset of mental health symptoms 2 years ago TBI.  Eran was first diagnosed 2 years ago.   Eran received the following mental health services in the past: counseling and psychiatry.   Psychiatric Hospitalizations: [facility] in August.   Eran denies a history of civil commitment.  " "    Onset/Duration/Pattern of Symptoms noted above:    Eran reports the following understanding of his diagnosis: depression and anxiety.      Personal Safety: suicidal thoughts 2 years ago, no attempts, just thoughts, \"could never bring myself to do it so I do not consider it an option\"  He denies SI, however Dr. Burns's nurse reports he comes to appointments reporting suicidal thoughts    [place]-Suicide Severity Rating Scale   Suicide Ideation   1.) Have you ever wished you were dead or that you could go to sleep and not wake up?     Lifetime: Yes, (if yes, please discribe) : 2 years ago, thoughts of being dead Past Month:  No   2.) Have you actually had any thoughts of killing yourself?   Lifetime:  Yes, (if yes, please discribe) : only thoughts, could not describe Past Month:  No   3.) Have you been thinking about how you might do this?     Lifetime:  Yes, (if yes, please discribe) : thoughts but could not remember detail Past Month:  No   4.) Have you had these thoughts and had some intention of acting on them?     Lifetime:  No Past Month:  No   5.) Have you started to work out the details of how to kill yourself?   Lifetime:  No Past Month:  No   6.) Do you intend to carry out this plan?      Lifetime:  No Past Month:  No   Intensity of Ideation   Intensity of ideation (1 being least severe, 5 being most severe):     Lifetime:  3, description of Ideation: said had thoughts after TBI but could not bring himself to do anything                                                                                                Past Month:  N/A   How often do you have these thoughts? not in 2 years    When you have the thoughts how long do they last?  N/A   Can you stop thinking about killing yourself or wanting to die if you want to?  Easily able to control thoughts   Are there things - anyone or anything (i.e. family, Mu-ism, pain of death) that stopped you from wanting to die or acting on thoughts of suicide? "  Protective factors definitely stopped you from attempting suicide      What sort of reasons did you have for thinking about wanting to die or killing yourself (ie end pain, stop how you were feeling, get attention or reaction, revenge)?  Mostly to end or stop the pain (you couldn't go on living with the pain or how you were feeling)   Suicidal Behavior   (Suicide Attempt) - Have you made a suicide attempt?     Lifetime:  No Past Month: No   Have you engaged in self-harm (non-suicidal self-injury)?  No   (Interrupted Attempt) - Has there been a time when you started to do something to end your life but someone or something stopped you before you actually did anything?  No   (Aborted or Self-Interrupted Attempt) - Has there been a time when you started to do something to try to end your life but you stopped yourself before you actually did anything?  No   (Preparatory Acts of Behavior) - Have you taken any steps towards making suicide attempt or preparing to kill yourself (such as collecting pills, getting a gun, giving valuables away or writing a suicide note)? No   Actual Lethality/Medical Damage:   0. No physical damage or very minor physical damage (e.g., surface scratches).   1. Minor physical damage (e.g., lethargic speech; first-degree burns; mild bleeding; sprains).  2. Moderate physical damage; medical attention needed (e.g., conscious but sleepy, somewhat responsive; second-degree burns; bleeding of major vessel).  3. Moderately severe physical damage; medical hospitalization and likely intensive care required (e.g., comatose with reflexes intact; third-degree burns less than 20% of body; extensive blood loss but can recover; major fractures).  4. Sever physical damage; medical hospitalization with intensive care required (e.g., comatose without reflexes; third-degree burs over 20% of body; extensive blood loss with unstable vital sign; major damage to a vital area).  5. Death    Attempt Date / Enter Code:  0        [employer]  Used with permission by Jami Acosta, PhD.               Guide to C-SSRS Risk Ratings   NO IDEATION:  with no active thoughts IDEATION: with a wish to die. IDEATION: with active thoughts. Risk Ratings   If Yes No No 0 - Very Low Risk   If NA Yes No 1 - Low Risk   If NA Yes Yes 2 - Low/moderate risk   IDEATION: associated thoughts of methods without intent or plan INTENT: Intent to follow through on suicide PLAN: Plan to follow through on suicide Risk Ratings cont...   If Yes No No 3 - Moderate Risk   If Yes Yes No 4 - High Risk   If Yes Yes Yes 5 - High Risk   The patient's ADDITIONAL RISK FACTORS and lack of PROTECTIVE FACTORS may increase their overall suicide risk ratings.      Additional Risk Factors:    Significant history of physical illness or chronic medical problems   Protective Factors: Sense of responsibility to family impact it would have on others      Risk Status   Risk Ratin  DA Staff:  SARAHAR to Tx team.    Additional information to support suicide risk rating:  TBI may put him at risk as there is question about his memory impairment with his provider reporting he does acknowledge SI during appts.       Additional Safety Questions:    Do you have a gun, weapons or other means (including medications) to harm yourself available to you? No   Do you take chances with your safety?   no   Have you ever thought about killing someone else? No   Have you ever heard voices telling you to harm yourself or others? No       Supports:   From whom do you receive support and how often? (family/friends/agency) Wife, 3 sons     Do your support people want/need education/resources? no        Is there anything in your life (current or history) that is satisfying to you (include leisure interests/hobbies)?   yes photography and plays guitar, volunteering, hiking, swimming, 3rd degree  in [employer] arts      Hope/Belief System:  Do you believe things  can get better? yes       Personal Safety Summary:          Eran denies current fears or concerns for personal safety.    Completed safety coping plan? yes        Substance Use History:       Substance: Hx of Use/Abuse: Last Use: Pattern of Use:   Alcohol no 2 years ago Said he would have a glass of wine with dinner and that was rare--notes from provider suggest the TBI occurred while client was under influence of alcohol   Cannabis no     Street Drugs no     Prescription Drugs no     Other no       Substance Use Disorder Treatment: Eran is currently receiving the following services: No indications of CD issues.       CAGE-AID:  Have you ever felt you ought to cut down on your drinking or drug use?   No    Have people annoyed you by criticizing your drinking or drug use?   No    Have you ever felt bad or guilty about your drinking or drug use?   No    Have you ever had a drink or used drugs first thing in the morning to steady your nerves or to get rid of a hangover?  No    Do you feel these issues have been adequately addressed? No If no, are you ready to address them now? Client denies chemical use problems    Chemical Dependency Assessment Recommended?  No        Eran has a negative Cage-Aid score.       Legal History:    Eran reports that he has not been involved with the legal system.   ________________________________________________________________________    Life Situation (Employment/School/Finances/Basic Needs):  Eran  is currently living with wife in a house.   The safety/stability of this environment is described as: stable    Eran is currently disabled:2 years ago after TBI   Eran describes a work Hx of he said he was the  and  for [employer] for 8 years,  Previous worked at [employer] in [place]  Eran reports finances are obtained through Disability  Eran does not identify his finances as a current stressor.  Eran denies a history of gambling  and denies a history of gambling treatment.     Eran reports his highest level of education is graduate school 2 masters degrees, Food Science and Microbiology from  system  Eran did not identify any learning problems   Erna describes academic performance as: good  Eran describes school social experience as: adequate    Earn denies concerns regarding his current ability to meet basic needs.     Social/Family History:  Eran  reports he grew up in [place].   Eran was the third born of 4 children  1 brother has passed away, still has 2 sisters living   Eran reports his biological parents are  mother still living she is 92,  Father passed 7 or 8 years ago   Eran describes his childhood as good, enjoyed school, did good in school  Eran describes his current relationships with his family of origin as good relationship with sisters     Eran identifies his relationship status as: . 15 years, 2nd marriage, children are from first marriage, 2 biological children and 3 stop children   Eran identifies his sexual orientation as: opposite sex   Eran denies sexual health concerns.     Eran reports having 2 biological children and 3 children    Eran describes the quantity/quality of his social relationships as good friends       Significant Losses / Trauma / Abuse / Neglect Issues / Developmental Incidents:  Eran denies significant loss/trauma/abuse/neglect issues/developmental incidents   Eran denies personal  experience.     Yazidi Preference/Spiritual Beliefs/Cultural Considerations: Confucianist    A. Ethnic Self-Identification:  Eran self-identifies his race/ethnicities as:  and his preferred language to be English. Parents born in [place], said parents immigrated in 1954.  He said both his parents were in the [facility]  Eran reports he does not need the assistance of an . Eran  reports he does not need other support or  "modifications involved in therapy.      B. Do you experience cultural bias (the practice of interpreting judging behavior by standards inherent to one's own culture) by other people as a stressor? If yes, describe how this relates to overall mental health symptoms.  No    C. Are there any cultural influences that may need to be considered for your treatment?  (This includes historical, geographical and familial factors that affect assessment and intervention processes). None identified.    Strengths/Vulnerabilities:   Eran identifies his personal strengths as: \"leading, managing, financial, physical, marriage\" .   Things that may interfere with the clients success in treatment include: none identified.   Other identified areas of vulnerability include: Anxiety with/without panic attacks  Cognitive impairment.     Medical History / Physical Health Screen:     Primary Care Physician: Eran has a non-[facility] Primary Care Provider. Their PCP is [facility]   Last Physical Exam: within the past year. Client was encouraged to follow up with PCP if symptoms were to develop.    Mental Health Medication Management Provider / Psychiatrist: Eran has a psychiatrist whose name and location are: Dr. Burns .     Last visit: unsure        Next visit: 09.27    Current medications including prescription, non-prescription, herbals, dietary aids and vitamins:  Per client report:   Outpatient Medications Marked as Taking for the 9/24/19 encounter (Hospital Encounter) with [facility]   Medication Sig     clonazePAM (KLONOPIN) 0.5 MG tablet Take one tab up to two times daily as needed for anxiety and 2 tabs at bedtime. May fill on or after 11/30/17. Next appointment 2/5/18     OLANZapine (ZYPREXA) 5 MG tablet Take 5 mg by mouth At Bedtime       Eran reports current medications are: Effective.   Eran describes taking his medications as: Requires assistance.  Erna reports taking prescribed medications " as prescribed.     Eran provides the following current assessment of pain:  0  .     Eran provides the following information regarding past significant medical conditions/diagnoses:      Medical:  Past Medical History:   Diagnosis Date     Anxiety ~8 months     Cancer (H) bladder ~8 years    Tumor removed by Dr. Cabrera     Coronary artery disease     potential. On crestor     Depressive disorder ~10 years     Gastroesophageal reflux disease ~ 10 years    omeprezole     Head injury May 12, 2016    occurred May 12, 2016 during accdent     Hearing problem May 12, 2016    occurred May12, 2016 during accicent     Mixed hyperlipidemia 1998    Hyperlipidemia     Reduced vision May 12, 2016    occurred during May 12, 2016 accident     Sensorineural hearing loss May 12 -    after accident     Sleep apnea        Surgical:  Past Surgical History:   Procedure Laterality Date     C NONSPECIFIC PROCEDURE  1962    stabismus/amblyopia       Allergy:   Eran reports   Allergies   Allergen Reactions     No Known Drug Allergies         Family History of Medical, Mental Health and/or Substance Use problems:  Per client report:   Family History   Problem Relation Age of Onset     Breast Cancer Maternal Grandmother      Cancer Brother         ceased     Heart Disease Father         ceased     Depression Father        Eran reports no current medical concerns.      General Health:   Have you had any exposure to any communicable disease in the past 2-3 weeks? no     Are you aware of safe sex practices? yes     Is there a possibility of pregnancy?  no       Nutrition:    Are you on a special diet? If yes, please explain:  no   Do you have any concerns regarding your nutritional status? If yes, please explain:  no   Have you had any appetite changes in the last 3 months?  No     Have you had any weight loss or weight gain in the last 3 months?  No     Do you have a history of an eating disorder? no   Do you have a history of being in  "an eating disorder program? no   Do you have any dental concerns? no   NOTE: BMI to be calculated following program admission.    Fall Risk:   Have you had any falls in the past 3 months? no     Do you currently use any assistive devices for mobility?   no     NOTE: If client reports 3 or more falls in the past 3 months, the client will not be accepted into the program until further assessment is completed by the program nurse. Check if a nurse is available to assess at time of DA.    NOTE: If client reports 2 falls in the past 3 months and/or the client currently uses assistive devices for mobility, the  will send an in-basket to the program nurse to meet with the client within the first week of programming.    Head Injury/Trauma:   Do you have a history of head injury / trauma? yes  TBI does not know particular impact, said 2 years ago he fell and hit his head,  He was in a coma for a week and hospitalized for 2 months   Do you have any cognitive impairment? yes       Per completion of the Medical History / Physical Health Screen, is there a recommendation to see / follow up with a primary care physician/clinic or dentist?    No.    Clinical Findings     Mental Status Assessment/Clinical Observation:  Appearance:   awake, alert and adequately groomed  Eye Contact:   good  Psychomotor Behavior: Normal  no evidence of tardive dyskinesia, dystonia, or tics  Attitude:   Cooperative    Oriented to:   All    Speech   Rate / Production: slurred, appeared to have left side impairment as mouth was downturned and he reported that side of his face needed to be \"fixed\"    Volume:  Normal   Mood:    Normal    Affect:    Appropriate      Thought Content:  poor memory seemed to affect content  no evidence of suicidal ideation or homicidal ideation and no evidence of psychotic thought  Thought Form:  disorganized and he would say one thing and come back to the subject with different information loosening of associations " present  Insight:    limited    Judgment:     limited  Attention Span/Concentration: limited  Recent and Remote Memory:  poor      Psychiatric Diagnosis:    Per record [facility]  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  If additional information or symptoms should present, the diagnoses may be changed at that time.    Medical Concerns that may Impact Treatment:   TBI    Psychosocial and Contextual Factors (V-Codes):  V62.89 Other problem related to psychosocial circumstances impact from TBI on daily functioning    WHODAS 2.0 SCORE: 15/89.6        Client and family participation in assessment:   Eran was alone during this assessment.   This assessment does include collateral information. Collateral collected from psychiatrist office and chart notes     Summary & Recommendations  Provide a brief summary of how diagnostic criteria is met (symptoms, duration & functional impairment), cause, prognosis, and likely consequences of symptoms. Include overview of pertinent client strengths, cultural influences, life situations, relationships, health concerns and how diagnosis interacts/impacts with client's life. Recommendations include: client preferences, prioritization of needed mental health, ancillary or other services and any referrals to services required by statute or rule.   Eran Almeida is a 64 year old, Choose not to answer,  male. Eran attended the DA  alone.     Reason for Referral: Eran was referred to 55+ Outpatient Program (55+) by [facility]. Eran reports the reason for referral at  "this time is depression.  He reports he has hyperventilation episodes that can last a whole day.  He said he has rapid breathing and feels anxious. He said he gets up at 4:30 or 5: each morning and the anxiety starts right away.   He said he it is from his brain.  He said he has depression.  He said he his wife works 3 days a week and he has internal depression.  He said \"I do not know how to explain it\".  He said it is  quiet when his wife is not there and sometimes he hyperventilates and feels anxious. He reports some difficulty falling asleep but most nights will sleep 8 hours and feel rested upon wakening. He denies suicidal thoughts.  He said he has hard time talking due to his brain injury.  He said he does attend a group at [facility] once per week.  He said he was on Adderall and \"they took me off\" but did not know why.  He wanted to know if a neurosurgeon was in the group because he wants to get his face fixed \"client pointed to right cheek\".  When asked why he said \"so I can talk\".  Said he volunteers a lot at [facility].  He said he takes a lot of pictures and enjoys photography.  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  He denies cultural needs for service.  Client reports the following strengths; \"leading, managing, financial, physical, marriage\".  Writer discussed the groups with the client and the focus on managing mental health and he said he is open to whatever is recommended.    Writer spoke to the referral source, Dr. Burns's nurse, regarding his presentation in the appointment today and she reports he comes to the office and presents differently.  She said he will come in with depressed mood, tearfulness, and increased anxiety.  She said he reports suicidal thoughts.  She said when they talk to him he seems lucid and has a good fund of knowledge. Writer explained the results of the MOCA 11/30 and suggested the client may feel overwhelmed with the programming in the " "program if he having significant memory problems.  Writer offered that the program only wants what will fit best for the client and there are some concerns the program may not be able to meet his needs as his depression may fluctuate with organic difficulties from the TBI. She said she understood and would talk to the referring MD and call back with further information.    Writer spoke to clinician in 55+ program who reported tracks do not focus on management of TBI. Discussed resources for services of persons with mood dysregulation as a result of TBI.    Eran verbalizes the following treatment/discharge goals:  \"wants to keep busy and make friends\".    Prognosis is Guarded. Without the recommended intervention, the client is likely to experience the following consequences of their symptoms: ongoing memory problems and disorganization leading to mood dysphoria.    Referrals to services required by statute or rule:   Report to child/adult protection services was NA.   The following referral(s) will be initiated: .    Program Recommendation: Services are not being recommended at this time due     Assessment Completed by: Danae Marks LP    "

## 2019-10-10 ENCOUNTER — DOCUMENTATION ONLY (OUTPATIENT)
Dept: CARE COORDINATION | Facility: CLINIC | Age: 64
End: 2019-10-10

## 2019-11-06 ENCOUNTER — HEALTH MAINTENANCE LETTER (OUTPATIENT)
Age: 64
End: 2019-11-06

## 2019-11-18 ENCOUNTER — TELEPHONE (OUTPATIENT)
Dept: OTOLARYNGOLOGY | Facility: CLINIC | Age: 64
End: 2019-11-18

## 2019-11-18 NOTE — TELEPHONE ENCOUNTER
[facility] Call Center    Phone Message    May a detailed message be left on voicemail: yes    Reason for Call: Other: Wife wants patient to be seen on 11-20 for follow up Botox.  Wife would be able to drive him this week.      Action Taken: Message routed to:  [facility] ([facility] ENT

## 2019-11-18 NOTE — TELEPHONE ENCOUNTER
Pt's wife called requesting to be seen by Dr. Trevino this Wednesday the 20th. Pt. Already has a pre existing appt with Dr. Trevino next Wednesday 11/27/19 @ 1400.    Dr. Trevino's schedule this Wednesday is completely booked.  Pt. Is agreeable to sticking with his original appt.    Johanny Perales RN  11/18/2019 12:30 PM

## 2019-11-27 ENCOUNTER — OFFICE VISIT (OUTPATIENT)
Dept: OTOLARYNGOLOGY | Facility: CLINIC | Age: 64
End: 2019-11-27
Payer: COMMERCIAL

## 2019-11-27 VITALS
HEART RATE: 102 BPM | BODY MASS INDEX: 39.13 KG/M2 | OXYGEN SATURATION: 91 % | DIASTOLIC BLOOD PRESSURE: 85 MMHG | WEIGHT: 261.2 LBS | SYSTOLIC BLOOD PRESSURE: 152 MMHG

## 2019-11-27 DIAGNOSIS — G51.39 HEMIFACIAL SPASM: Primary | ICD-10-CM

## 2019-11-27 ASSESSMENT — PAIN SCALES - GENERAL: PAINLEVEL: NO PAIN (0)

## 2019-11-27 NOTE — NURSING NOTE
Chief Complaint   Patient presents with     Botox     botox      BP (!) 152/85   Pulse 102   Wt 118.5 kg (261 lb 3.2 oz)   SpO2 91%   BMI 39.13 kg/m        Sadaf Demarco LPN

## 2019-11-27 NOTE — NURSING NOTE
Obtained consent for Botulinum Toxin.  Discussed possible side effects including but not limited to unwanted facial weakness or paralysis, bruising, swelling, redness, pain, upper eyelid ptosis.  Botox will begin to work in 4 - 5 days, fully working by 10 days.  Botox lasts for approximately 3 months.      Prepped skin with isopropyl alcohol and gauze, intermittent ice for comfort.  Patient tolerated procedure well.      Johanny Perales RN  11/27/2019 3:57 PM

## 2019-11-27 NOTE — LETTER
11/27/2019       RE: Eran Almeida  [address]  [facility] [address]     Dear Colleague,    Thank you for referring your patient, Eran Almeida, to the [facility] EAR NOSE AND THROAT at [facility]. Please see a copy of my visit note below.    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 Treatment with physical therapy has been maximized and the patient is referred for chemodenervation with botulinum toxin, The risks and benefits of the procedure were discussed.      Procedure: Chemodenervation with Botulinum Toxin A  Indication: Hemifacial Spasm and Hypercontracture  Injector: Kaci Trevino MD      Informed consent was obtained.  The skin was cleaned with antimicrobial solution and a topical ice was placed.     The patient was asked to systematically engage the muscles in the area to be injected. The tuberculin needles were used for subdermal injection and hemostasis was obtained with light digital pressure when needed. The skin was cleaned.       A total of 51 units were injected.  Botulinum toxin A type: Botox    Please see procedure log.    The patient tolerated the procedure well and there were no complications. Post procedure care instructions were given to the patient.      Again, thank you for allowing me to participate in the care of your patient.      Sincerely,    Kaci Trevino MD

## 2019-12-17 NOTE — PROGRESS NOTES
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 Treatment with physical therapy has been maximized and the patient is referred for chemodenervation with botulinum toxin, The risks and benefits of the procedure were discussed.      Procedure: Chemodenervation with Botulinum Toxin A  Indication: Hemifacial Spasm and Hypercontracture  Injector: Kaci Trevino MD      Informed consent was obtained.  The skin was cleaned with antimicrobial solution and a topical ice was placed.     The patient was asked to systematically engage the muscles in the area to be injected. The tuberculin needles were used for subdermal injection and hemostasis was obtained with light digital pressure when needed. The skin was cleaned.       A total of 51 units were injected.  Botulinum toxin A type: Botox    Please see procedure log.    The patient tolerated the procedure well and there were no complications. Post procedure care instructions were given to the patient.

## 2020-02-05 ENCOUNTER — OFFICE VISIT (OUTPATIENT)
Dept: OTOLARYNGOLOGY | Facility: CLINIC | Age: 65
End: 2020-02-05
Payer: COMMERCIAL

## 2020-02-05 VITALS — WEIGHT: 272 LBS | HEIGHT: 71 IN | BODY MASS INDEX: 38.08 KG/M2

## 2020-02-05 DIAGNOSIS — G51.39 HEMIFACIAL SPASM: Primary | ICD-10-CM

## 2020-02-05 ASSESSMENT — MIFFLIN-ST. JEOR: SCORE: 2050.91

## 2020-02-05 ASSESSMENT — PAIN SCALES - GENERAL: PAINLEVEL: NO PAIN (0)

## 2020-02-05 NOTE — PROGRESS NOTES
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 Treatment with physical therapy has been maximized and the patient is referred for chemodenervation with botulinum toxin, The risks and benefits of the procedure were discussed.      Procedure: Chemodenervation with Botulinum Toxin A  Indication: Hemifacial Spasm and Hypercontracture  Injector: Kaci Trevino MD      Informed consent was obtained.  The skin was cleaned with antimicrobial solution and a topical ice was placed.     The patient was asked to systematically engage the muscles in the area to be injected. The tuberculin needles were used for subdermal injection and hemostasis was obtained with light digital pressure when needed. The skin was cleaned.       A total of 54 units were injected.  Botulinum toxin A type: Botox    Please see procedure log.    The patient tolerated the procedure well and there were no complications. Post procedure care instructions were given to the patient.

## 2020-02-05 NOTE — LETTER
2/5/2020     RE: Eran Almeida  [address]  [facility] [address]     Dear Colleague,    Thank you for referring your patient, Eran Almeida, to the [facility] EAR NOSE AND THROAT at [facility]. Please see a copy of my visit note below.    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 Treatment with physical therapy has been maximized and the patient is referred for chemodenervation with botulinum toxin, The risks and benefits of the procedure were discussed.      Procedure: Chemodenervation with Botulinum Toxin A  Indication: Hemifacial Spasm and Hypercontracture  Injector: Kaci Trevino MD      Informed consent was obtained.  The skin was cleaned with antimicrobial solution and a topical ice was placed.     The patient was asked to systematically engage the muscles in the area to be injected. The tuberculin needles were used for subdermal injection and hemostasis was obtained with light digital pressure when needed. The skin was cleaned.     A total of 54 units were injected.  Botulinum toxin A type: Botox    Please see procedure log.    The patient tolerated the procedure well and there were no complications. Post procedure care instructions were given to the patient.    Kaci Trevino MD

## 2020-02-05 NOTE — NURSING NOTE
Obtained consent for Botulinum Toxin.  Discussed possible side effects including but not limited to unwanted facial weakness or paralysis, bruising, swelling, redness, pain, upper eyelid ptosis.  Botox will begin to work in 4 - 5 days, fully working by 10 days.  Botox lasts for approximately 3 months.      Prepped skin with isopropyl alcohol and gauze, intermittent ice for comfort.  Patient tolerated procedure well.      Johanny Perales RN  2/5/2020 3:54 PM

## 2020-02-05 NOTE — NURSING NOTE
"Chief Complaint   Patient presents with     RECHECK     Follow up for botox     Height 1.803 m (5' 11\"), weight 123.4 kg (272 lb).    Priscilla Hardwick, EMT  "

## 2020-07-22 ENCOUNTER — OFFICE VISIT (OUTPATIENT)
Dept: OTOLARYNGOLOGY | Facility: CLINIC | Age: 65
End: 2020-07-22
Payer: COMMERCIAL

## 2020-07-22 VITALS — HEART RATE: 100 BPM | WEIGHT: 271 LBS | TEMPERATURE: 98 F | BODY MASS INDEX: 37.94 KG/M2 | HEIGHT: 71 IN

## 2020-07-22 DIAGNOSIS — G24.5 BLEPHAROSPASM: Primary | ICD-10-CM

## 2020-07-22 DIAGNOSIS — G51.0 FACIAL PARALYSIS: ICD-10-CM

## 2020-07-22 DIAGNOSIS — M43.6 SPASTIC TORTICOLLIS: ICD-10-CM

## 2020-07-22 DIAGNOSIS — G51.39 HEMIFACIAL SPASM: ICD-10-CM

## 2020-07-22 RX ORDER — CLOMIPRAMINE HYDROCHLORIDE 75 MG/1
200 CAPSULE ORAL AT BEDTIME
COMMUNITY
Start: 2019-01-29

## 2020-07-22 ASSESSMENT — PAIN SCALES - GENERAL: PAINLEVEL: NO PAIN (0)

## 2020-07-22 ASSESSMENT — MIFFLIN-ST. JEOR: SCORE: 2046.38

## 2020-07-22 NOTE — NURSING NOTE
"Chief Complaint   Patient presents with     RECHECK     3 month follow up, botox injection     Pulse 100, temperature 98  F (36.7  C), temperature source Temporal, height 1.803 m (5' 11\"), weight 122.9 kg (271 lb).    Priscilla Hardwick, EMT  "

## 2020-07-22 NOTE — LETTER
7/22/2020       RE: Eran Almeida  [address]  [facility] [address]     Dear Colleague,    Thank you for referring your patient, Eran Almeida, to the [facility] EAR NOSE AND THROAT at [facility] Please see a copy of my visit note below.    Facial Plastic and Reconstructive Surgery      Eran Almeida comes in today for Botox treatment.  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  He has been treated multiple times with Botox and responds well.    Today on examination he continues to have the hemifacial spasm and the hyper contracture of different areas.  His speech is very forced and he has breathiness.  A notable thing on exam today that is quite different from him previously is that he is persistently holding his breath and bearing down.  He Valsalvas to the point where his face becomes quite red and he has prominence in the neck of his vessels.  He does this multiple times during the visit and does not appear aware that he is doing it.  He states that it feels like it helps him breathe.    Other than that his facial examination is not significantly changed.  And I do think botulinum toxin will be beneficial for him today.      Procedure: Chemodenervation with Botulinum Toxin A  Indication: Hemifacial Spasm and Hypercontracture  Injector: Kaci Trevino MD      Informed consent was obtained.  The skin was cleaned with antimicrobial solution and a topical ice was placed.     The patient was asked to systematically engage the muscles in the area to be injected. The tuberculin needles were used for subdermal injection and hemostasis was obtained with light digital pressure when needed. The skin was cleaned.     A total of 39 units were injected.  Botulinum toxin A type: Botox    Please see procedure log.    The patient tolerated the procedure well and there were no complications. Post procedure care  instructions were given to the patient.      Again, thank you for allowing me to participate in the care of your patient.      Sincerely,    Kaci Trevino MD

## 2020-07-23 NOTE — NURSING NOTE
Obtained consent for Botulinum Toxin.  Discussed possible side effects including but not limited to unwanted facial weakness or paralysis, bruising, swelling, redness, pain, upper eyelid ptosis.  Botox will begin to work in 4 - 5 days, fully working by 10 days.  Botox lasts for approximately 3 months.      Prepped skin with isopropyl alcohol and gauze, intermittent ice for comfort.  Patient tolerated procedure well.     Will work to obtain PA for removal of Left Platysma.    Johanny Perlaes RN  7/23/2020 2:35 PM

## 2020-07-24 PROBLEM — M43.6 SPASTIC TORTICOLLIS: Status: ACTIVE | Noted: 2020-07-24

## 2020-07-24 NOTE — PROGRESS NOTES
Facial Plastic and Reconstructive Surgery      Eran Almeida comes in today for Botox treatment.  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  He has been treated multiple times with Botox and responds well.    Today on examination he continues to have the hemifacial spasm and the hyper contracture of different areas.  His speech is very forced and he has breathiness.  A notable thing on exam today that is quite different from him previously is that he is persistently holding his breath and bearing down.  He Valsalvas to the point where his face becomes quite red and he has prominence in the neck of his vessels.  He does this multiple times during the visit and does not appear aware that he is doing it.  He states that it feels like it helps him breathe.    Other than that his facial examination is not significantly changed.  And I do think botulinum toxin will be beneficial for him today.      Procedure: Chemodenervation with Botulinum Toxin A  Indication: Hemifacial Spasm and Hypercontracture  Injector: Kaci Trevino MD      Informed consent was obtained.  The skin was cleaned with antimicrobial solution and a topical ice was placed.     The patient was asked to systematically engage the muscles in the area to be injected. The tuberculin needles were used for subdermal injection and hemostasis was obtained with light digital pressure when needed. The skin was cleaned.     A total of 39 units were injected.  Botulinum toxin A type: Botox    Please see procedure log.    The patient tolerated the procedure well and there were no complications. Post procedure care instructions were given to the patient.

## 2020-07-29 ENCOUNTER — APPOINTMENT (OUTPATIENT)
Dept: CT IMAGING | Facility: CLINIC | Age: 65
End: 2020-07-29
Attending: EMERGENCY MEDICINE
Payer: COMMERCIAL

## 2020-07-29 ENCOUNTER — HOSPITAL ENCOUNTER (EMERGENCY)
Facility: CLINIC | Age: 65
Discharge: HOME | End: 2020-07-29
Attending: EMERGENCY MEDICINE | Admitting: EMERGENCY MEDICINE
Payer: COMMERCIAL

## 2020-07-29 DIAGNOSIS — M43.6 SPASTIC TORTICOLLIS: Primary | ICD-10-CM

## 2020-07-29 DIAGNOSIS — H53.9 VISUAL DISTURBANCE: ICD-10-CM

## 2020-07-29 LAB
ALBUMIN UR-MCNC: NEGATIVE MG/DL
ANION GAP SERPL CALCULATED.3IONS-SCNC: 6 MMOL/L (ref 3–14)
APPEARANCE UR: CLEAR
APTT PPP: 31 SEC (ref 22–37)
BASOPHILS # BLD AUTO: 0.1 10E9/L (ref 0–0.2)
BASOPHILS NFR BLD AUTO: 0.6 %
BILIRUB UR QL STRIP: NEGATIVE
BUN SERPL-MCNC: 16 MG/DL (ref 7–30)
CALCIUM SERPL-MCNC: 8.6 MG/DL (ref 8.5–10.1)
CHLORIDE SERPL-SCNC: 111 MMOL/L (ref 94–109)
CO2 SERPL-SCNC: 23 MMOL/L (ref 20–32)
COLOR UR AUTO: ABNORMAL
CREAT SERPL-MCNC: 1.18 MG/DL (ref 0.66–1.25)
DIFFERENTIAL METHOD BLD: ABNORMAL
EOSINOPHIL # BLD AUTO: 0 10E9/L (ref 0–0.7)
EOSINOPHIL NFR BLD AUTO: 0.3 %
ERYTHROCYTE [DISTWIDTH] IN BLOOD BY AUTOMATED COUNT: 13.5 % (ref 10–15)
GFR SERPL CREATININE-BSD FRML MDRD: 65 ML/MIN/{1.73_M2}
GLUCOSE BLDC GLUCOMTR-MCNC: 114 MG/DL (ref 70–99)
GLUCOSE SERPL-MCNC: 101 MG/DL (ref 70–99)
GLUCOSE UR STRIP-MCNC: NEGATIVE MG/DL
HCT VFR BLD AUTO: 35.6 % (ref 40–53)
HGB BLD-MCNC: 11.3 G/DL (ref 13.3–17.7)
HGB UR QL STRIP: NEGATIVE
IMM GRANULOCYTES # BLD: 0 10E9/L (ref 0–0.4)
IMM GRANULOCYTES NFR BLD: 0.2 %
INR PPP: 1.14 (ref 0.86–1.14)
INTERPRETATION ECG - MUSE: NORMAL
KETONES UR STRIP-MCNC: NEGATIVE MG/DL
LEUKOCYTE ESTERASE UR QL STRIP: NEGATIVE
LYMPHOCYTES # BLD AUTO: 1.8 10E9/L (ref 0.8–5.3)
LYMPHOCYTES NFR BLD AUTO: 19.8 %
MCH RBC QN AUTO: 28.8 PG (ref 26.5–33)
MCHC RBC AUTO-ENTMCNC: 31.7 G/DL (ref 31.5–36.5)
MCV RBC AUTO: 91 FL (ref 78–100)
MONOCYTES # BLD AUTO: 0.8 10E9/L (ref 0–1.3)
MONOCYTES NFR BLD AUTO: 9.3 %
MUCOUS THREADS #/AREA URNS LPF: PRESENT /LPF
NEUTROPHILS # BLD AUTO: 6.3 10E9/L (ref 1.6–8.3)
NEUTROPHILS NFR BLD AUTO: 69.8 %
NITRATE UR QL: NEGATIVE
NRBC # BLD AUTO: 0 10*3/UL
NRBC BLD AUTO-RTO: 0 /100
PH UR STRIP: 7.5 PH (ref 5–7)
PLATELET # BLD AUTO: 236 10E9/L (ref 150–450)
POTASSIUM SERPL-SCNC: 3.7 MMOL/L (ref 3.4–5.3)
RBC # BLD AUTO: 3.92 10E12/L (ref 4.4–5.9)
RBC #/AREA URNS AUTO: <1 /HPF (ref 0–2)
SODIUM SERPL-SCNC: 140 MMOL/L (ref 133–144)
SOURCE: ABNORMAL
SP GR UR STRIP: 1 (ref 1–1.03)
SQUAMOUS #/AREA URNS AUTO: <1 /HPF (ref 0–1)
TROPONIN I SERPL-MCNC: <0.015 UG/L (ref 0–0.04)
UROBILINOGEN UR STRIP-MCNC: NORMAL MG/DL (ref 0–2)
WBC # BLD AUTO: 9.1 10E9/L (ref 4–11)
WBC #/AREA URNS AUTO: 1 /HPF (ref 0–5)

## 2020-07-29 PROCEDURE — 96361 HYDRATE IV INFUSION ADD-ON: CPT

## 2020-07-29 PROCEDURE — 85610 PROTHROMBIN TIME: CPT | Performed by: EMERGENCY MEDICINE

## 2020-07-29 PROCEDURE — 85025 COMPLETE CBC W/AUTO DIFF WBC: CPT | Performed by: EMERGENCY MEDICINE

## 2020-07-29 PROCEDURE — 84484 ASSAY OF TROPONIN QUANT: CPT | Performed by: EMERGENCY MEDICINE

## 2020-07-29 PROCEDURE — 25800030 ZZH RX IP 258 OP 636: Performed by: EMERGENCY MEDICINE

## 2020-07-29 PROCEDURE — 70498 CT ANGIOGRAPHY NECK: CPT

## 2020-07-29 PROCEDURE — 93005 ELECTROCARDIOGRAM TRACING: CPT

## 2020-07-29 PROCEDURE — 0042T CT HEAD PERFUSION WITH CONTRAST: CPT

## 2020-07-29 PROCEDURE — 25000128 H RX IP 250 OP 636: Performed by: EMERGENCY MEDICINE

## 2020-07-29 PROCEDURE — 70450 CT HEAD/BRAIN W/O DYE: CPT

## 2020-07-29 PROCEDURE — 00000146 ZZHCL STATISTIC GLUCOSE BY METER IP

## 2020-07-29 PROCEDURE — 81001 URINALYSIS AUTO W/SCOPE: CPT | Performed by: EMERGENCY MEDICINE

## 2020-07-29 PROCEDURE — 99285 EMERGENCY DEPT VISIT HI MDM: CPT | Mod: 25

## 2020-07-29 PROCEDURE — 96360 HYDRATION IV INFUSION INIT: CPT | Mod: 59

## 2020-07-29 PROCEDURE — 80048 BASIC METABOLIC PNL TOTAL CA: CPT | Performed by: EMERGENCY MEDICINE

## 2020-07-29 PROCEDURE — 85730 THROMBOPLASTIN TIME PARTIAL: CPT | Performed by: EMERGENCY MEDICINE

## 2020-07-29 RX ORDER — CEFAZOLIN SODIUM IN 0.9 % NACL 3 G/100 ML
3 INTRAVENOUS SOLUTION, PIGGYBACK (ML) INTRAVENOUS
Status: CANCELLED | OUTPATIENT
Start: 2020-07-29

## 2020-07-29 RX ORDER — CEFAZOLIN SODIUM 1 G/50ML
1 INJECTION, SOLUTION INTRAVENOUS SEE ADMIN INSTRUCTIONS
Status: CANCELLED | OUTPATIENT
Start: 2020-07-29

## 2020-07-29 RX ORDER — IOPAMIDOL 755 MG/ML
500 INJECTION, SOLUTION INTRAVASCULAR ONCE
Status: COMPLETED | OUTPATIENT
Start: 2020-07-29 | End: 2020-07-29

## 2020-07-29 RX ADMIN — IOPAMIDOL 120 ML: 755 INJECTION, SOLUTION INTRAVENOUS at 14:16

## 2020-07-29 RX ADMIN — SODIUM CHLORIDE 95 ML: 9 INJECTION, SOLUTION INTRAVENOUS at 14:16

## 2020-07-29 ASSESSMENT — MIFFLIN-ST. JEOR: SCORE: 2064.52

## 2020-07-29 NOTE — ED PROVIDER NOTES
Assumed care at 1500.  Patient presented for visual disturbance.  Symptoms improved.  Awaiting labs and MRI.    Unfortunately, MRI was not completed.  Patient with metalic tarsal plate.  Unclear if MRI compatible (cannot find implant description in chart and family does not have info).  Discussed with patient option for admission for neurologic monitoring and neurology consult and to sort out MRI compatibility.  Patient is now asymptomatic and desires discharge home.  Recommended baby aspirin daily, outpatient neuro consult (consult faxed), 2 days PCP follow up.  Educated patient on the signs and symptoms of stroke and reasons to return to ED.  Patient and his wife desire discharge.  They understand that CT imaging does not exclude small stroke and MRI would be required for gold standard eval.  They understand risk of missed CVA and recurrent or progressive symptoms.  I feel that they are compentent to make this decision.    Discharge home.    (H53.9) Visual disturbance    MD Sugey Lee Kristi Jo Schneider, MD  07/29/20 0784

## 2020-07-29 NOTE — ED AVS SNAPSHOT
[facility]  [address]  [facility] [address]  Phone:  [phone]  Fax:  [phone]                                    Eran Almeida   MRN: [id number]    Department:  [facility]   Date of Visit:  7/29/2020           After Visit Summary Signature Page    I have received my discharge instructions, and my questions have been answered. I have discussed any challenges I see with this plan with the nurse or doctor.    ..........................................................................................................................................  Patient/Patient Representative Signature      ..........................................................................................................................................  Patient Representative Print Name and Relationship to Patient    ..................................................               ................................................  Date                                   Time    ..........................................................................................................................................  Reviewed by Signature/Title    ...................................................              ..............................................  Date                                               Time          22EPIC Rev 08/18

## 2020-07-29 NOTE — ED NOTES
Discussed risks associated with leaving and when to return with neurologic change/decline. Verbalizes understanding.

## 2020-07-29 NOTE — CONSULTS
"[facility]    Stroke Telephone Note    I was called by Dr. Moon Mayes on 07/29/20 at 1352 regarding patient Eran Almeida. The patient is a 65 year old male with history of TBI who presents with transient visual obscuration like a checkerboard and a feeling he would fall.    Stroke Code Data  (for stroke code without tele)  Stroke code activated 07/29/20   1352   First stroke provider response 07/29/20   1353   Last known normal 07/29/20   1129   Time of discovery   (or onset of symptoms) 07/29/20   1130   Head CT read by me 07/29/20   1407   Was stroke code de-escalated? Yes 07/29/20 1439  symptoms not likely caused by stroke       TPA Treatment   Not given due to minor/isolated/quickly resolving symptoms.    Endovascular Treatment  Not initiated due to absence of proximal vessel occlusion    Impression  transient visual disturbance and gait instability- etiology unclear, could represent stroke/TIA, near syncope, seizure, migraine aura    Recommendations  MRI brain w/ and w/o contrast to clarify diagnosis    My recommendations are based on the limited information provided on the phone by Dr. Moon Mayes. They are not intended to replace the clinical judgment of Dr. Moon Mayes which should always be utilized to provide the most appropriate care to meet the unique needs of this patient.  I was not requested to personally see or examine the patient at this time.    The Stroke Staff is Dr. Chadwick.    Martha Begum MD  Vascular Neurology Fellow  To page me or covering stroke neurology team member, click here: [facility]   Choose \"On Call\" tab at top, then search dropdown box for \"Neurology Adult\", select location, press Enter, then look for stroke/neuro ICU/telestroke.    "

## 2020-07-29 NOTE — ED PROVIDER NOTES
"  History     Chief Complaint:  Visual disturbance    HPI   Eran Almeida is a 65 year old male with a history of a TBI, bladder cancer (in remission) who presents with visual disturbance.  Patient is somewhat of a limited historian. Per report this morning the patient started \"seeing things\" and felt dizzy. Before this, the patient was texting his wife saying he was doing great and he was last seen normal around 1100. The patient states that he is no longer feeling dizzy or \"seeing things\" but he describes the visual disturbance as \"like checkers.\"  He denies any associated fever, cough, headache, chest pain, focal weakness or paresthesias. He is currently having trouble word finding but the wife states that he normally struggles word finding secondary to his TBI.  No history of anticoagulation.     Allergies:  No Known Drug Allergies    Medications:    Symmetrel  Adderall  Lipitor  Anafranil  Klonopin  Olanzapine  Prilosec  Viagra  Crestor  Prilosec  Zyprexa    Past Medical History:    Tarsorrhaphy  Blepharospasm  Spastic torticollis  Oral phase dysphagia  Facial paralysis  Dysphonia  Dysarthria  Hemifacial spasm  Anxiety  Cancer  CAD  Depressive disorder  GERD  TBI  Hearing problem  Mixed hyperlipidemia  Reduced vision  Sensorineural hearing loss  Obstructive sleep apnea  Hyperlipidemia  Hypercholesterolemia  Lumbago  Cervicalgia  Disorder of eye movements  Testicular hypofunction  Lumbosacral radiculitis  Cellulitis  Rotator cuff syndrome  Malignant neoplasm of posterior wall of urinary bladder  Sebaceous cyst  Prostatitis  Hyperreflexia  Major neurocognitive disorder due to TBI with behavioral disturbance    Past Surgical History:    Stabismus/amblyopia  Tarsorrhaphy  Shoulder arthroplasty  Abscess drainage  Strabismus surgery    Family History:    Mother: Cataract, macular degeneration  Brother: Esophageal cancer  Sister: Lung cancer  Father: Depression, heart disease    Social History:  The patient was " "accompanied to the ED by his wife.  Smoking Status: Former Smoker  Smokeless Tobacco: Never Used  Alcohol Use: Negative  Drug Use: Negative  PCP: Vinicius Bray  Marital Status:       Review of Systems   Constitutional: Negative for fever.   Eyes: Positive for visual disturbance.   Cardiovascular: Negative for chest pain.   Neurological: Positive for dizziness. Negative for weakness and numbness.   All other systems reviewed and are negative.      Physical Exam     Patient Vitals for the past 24 hrs:   BP Temp Temp src Pulse Heart Rate SpO2 Height Weight   07/29/20 1700 135/85 -- -- 93 84 94 % -- --   07/29/20 1645 133/81 -- -- 94 93 97 % -- --   07/29/20 1606 -- -- -- -- 87 96 % -- --   07/29/20 1602 (!) 127/94 -- -- 88 -- -- -- --   07/29/20 1600 (!) 128/109 -- -- 87 98 96 % -- --   07/29/20 1500 131/74 -- -- 96 99 96 % -- --   07/29/20 1430 (!) 129/93 98.1  F (36.7  C) Oral 92 89 97 % -- --   07/29/20 1420 (!) 143/79 -- -- -- -- -- -- --   07/29/20 1400 -- -- -- -- -- 93 % -- --   07/29/20 1350 -- -- -- -- -- 98 % -- --   07/29/20 1300 -- -- -- -- -- -- (P) 1.803 m (5' 11\") (P) 124.7 kg (275 lb)       Physical Exam  General: Well-nourished, nontoxic  Eyes: PERRL, EOMI, no nystagmus.  No scleral icterus or conjunctival injection.    ENT:  Moist mucus membranes, posterior oropharynx clear without erythema or exudates. TM normal bilaterally  Neck: Supple with full range of motion  Respiratory:  Lungs clear to auscultation bilaterally, no crackles/rubs/wheezes.  Good air movement  CV: Normal rate and rhythm, no murmurs/rubs/gallops  GI:  Abdomen soft and non-distended.  No tenderness, guarding or rebound  Skin: Warm, dry.  No rashes or petechiae  MSK: No peripheral edema or calf tenderness  Neuro: Alert and oriented to person/place/time.  Mild dysarthria, baseline per wife.  No obvious facial droop. Tongue midline, normal strength at SCM/trapezius/BUE/BLE.  Normal finger to nose. Sensation intact " over face/BUE/BLE  Psychiatric: Blunt affect      Emergency Department Course     ECG (15:05:11):  Rate 91 bpm. NV interval 162. QRS duration 86. QT/QTc 384/472. P-R-T axes 49  -9  35. Interpreted at 1505 by Yanna Sanchez DO    Imaging:  Radiology findings were communicated with the patient and wife who voiced understanding of the findings.    CT Head Perfusion w Contrast   Final Result   IMPRESSION: Normal CT perfusion of the brain.      PRITI ALVARENGA MD      CTA Head Neck with Contrast   Final Result   IMPRESSION:    1. No stenosis is seen at either carotid bifurcation.   2. No arterial dissection is identified.   3. No high-grade intracranial vascular stenosis is identified.   4. Venous sinuses appear patent            PRITI ALVARENGA MD      CT Head w/o Contrast   Final Result   IMPRESSION:   1. No acute pathology, no bleed, mass, or areas of acute infarction   are identified      2. Metallic device in the superior aspect of the right orbit. This   appears to be related to the right globe.         RPITI ALVARENGA MD      MR Skull Base wo & w Contrast    (Results Pending)        Laboratory:  Laboratory findings were communicated with the patient and wife who voiced understanding of the findings.    Labs Ordered and Resulted from Time of ED Arrival Up to the Time of Departure from the ED   CBC WITH PLATELETS DIFFERENTIAL - Abnormal; Notable for the following components:       Result Value    RBC Count 3.92 (*)     Hemoglobin 11.3 (*)     Hematocrit 35.6 (*)     All other components within normal limits   BASIC METABOLIC PANEL - Abnormal; Notable for the following components:    Chloride 111 (*)     Glucose 101 (*)     All other components within normal limits   GLUCOSE BY METER - Abnormal; Notable for the following components:    Glucose 114 (*)     All other components within normal limits   ROUTINE UA WITH MICROSCOPIC - Abnormal; Notable for the following components:    pH Urine 7.5 (*)     Mucous Urine Present (*)      All other components within normal limits   INR   PARTIAL THROMBOPLASTIN TIME   TROPONIN I   GLUCOSE MONITOR NURSING POCT   ISTAT CREATININE NURSING POCT   PULSE OXIMETRY NURSING   NOTIFY       Interventions:  Medications   0.9% sodium chloride BOLUS (95 mLs Intravenous New Bag 7/29/20 1416)   iopamidol (ISOVUE-370) solution 500 mL (120 mLs Intravenous Given 7/29/20 1416)        Emergency Department Course:    Past medical records, nursing notes, and vitals reviewed.  1347: I performed an exam of the patient and obtained history, as documented above.     IV was inserted and blood was drawn for laboratory testing, results above.    The patient provided a urine sample here in the emergency department. This was sent for laboratory testing, findings above.    The patient was sent for a CT while in the emergency department, findings above    1539: I rechecked and updated the patient.     1353 I spoke with Neuro/stroke, DONOVAN Silva. Recommended MRI    I personally reviewed the laboratory and imaging results with the Patient and answered all related questions prior to sign out.     The patient was signed out to Dr. Mayes pending     Impression & Plan    National Institutes [employer] Stroke Scale  Exam Interval: Baseline   Score    Level of consciousness: (0)   Alert, keenly responsive    LOC questions: (0)   Answers both questions correctly    LOC commands: (0)   Performs both tasks correctly    Best gaze: (0)   Normal    Visual: (0)   No visual loss    Facial palsy: (0)   Normal symmetrical movements    Motor arm (left): (0)   No drift    Motor arm (right): (0)   No drift    Motor leg (left): (0)   No drift    Motor leg (right): (0)   No drift    Limb ataxia: (0)   Absent    Sensory: (0)   Normal- no sensory loss    Best language: (1)   Mild to moderate aphasia    Dysarthria: (0)   Normal    Extinction and inattention: (0)   No abnormality        Total Score:  1       Medical Decision Making:  Eran Almeida is a 65  year old male who presents to the emergency department today for evaluation of   Visual disturbance/dizziness though states symptoms completely resolved prior to arrival.  Patient is a limited historian.  Concern for possible CVA on arrival so code stroke called.  Fortunately CT head and CTA head and neck unremarkable for ischemia, hemorrhage or vascular dissection.  Stroke neurology recommended pursuing formal MRI at this time.  Exam was difficult given patient's baseline neuro exam 2/2 to TBI history.  EKG without focal ischemia or underlying arrhythmia.  Screening troponin negative; he denies any active chest pain and I doubt ACS.  Remainder of labs without profound anemia or electrolyte abnormalities.  Patient signed out to my partner Dr. Mayes pending MRI brain and reevaluation.  Patient remained asymptomatic during his time in the ED under my care; should his MRI be negative, he will likely be stable for discharge home.      Diagnosis:      ICD-10-CM    1. Visual disturbance  H53.9 Troponin I         Disposition:   The patient is signed out to my colleague, Dr. Mayes.    Scribe Disclosure:  I, Toni Rolon, am serving as a scribe at 1:53 PM on 7/29/2020 to document services personally performed by Yanna Sanchez DO based on my observations and the provider's statements to me.  [facility] EMERGENCY DEPARTMENT         Yanna Sanchez DO  07/30/20 0839

## 2020-07-29 NOTE — ED NOTES
"It was reported that the patient was experiencing Visual hallucinations, specifically \"patterns\".  Pt's speech  No gross deficients noted.   "

## 2020-07-30 VITALS
OXYGEN SATURATION: 94 % | DIASTOLIC BLOOD PRESSURE: 85 MMHG | TEMPERATURE: 98.1 F | SYSTOLIC BLOOD PRESSURE: 135 MMHG | HEART RATE: 93 BPM

## 2020-07-30 ASSESSMENT — ENCOUNTER SYMPTOMS
FEVER: 0
WEAKNESS: 0
DIZZINESS: 1
NUMBNESS: 0

## 2020-08-05 ENCOUNTER — TELEPHONE (OUTPATIENT)
Dept: OTOLARYNGOLOGY | Facility: CLINIC | Age: 65
End: 2020-08-05

## 2020-08-05 ENCOUNTER — HOSPITAL ENCOUNTER (OUTPATIENT)
Facility: AMBULATORY SURGERY CENTER | Age: 65
End: 2020-08-05
Attending: OTOLARYNGOLOGY
Payer: COMMERCIAL

## 2020-08-06 DIAGNOSIS — Z11.59 ENCOUNTER FOR SCREENING FOR OTHER VIRAL DISEASES: Primary | ICD-10-CM

## 2020-09-11 ENCOUNTER — APPOINTMENT (OUTPATIENT)
Dept: CT IMAGING | Facility: CLINIC | Age: 65
End: 2020-09-11
Attending: EMERGENCY MEDICINE
Payer: COMMERCIAL

## 2020-09-11 ENCOUNTER — HOSPITAL ENCOUNTER (EMERGENCY)
Facility: CLINIC | Age: 65
Discharge: HOME | End: 2020-09-11
Attending: EMERGENCY MEDICINE | Admitting: EMERGENCY MEDICINE
Payer: COMMERCIAL

## 2020-09-11 VITALS
RESPIRATION RATE: 20 BRPM | TEMPERATURE: 97 F | OXYGEN SATURATION: 100 % | BODY MASS INDEX: 35.98 KG/M2 | WEIGHT: 257 LBS | HEIGHT: 71 IN | DIASTOLIC BLOOD PRESSURE: 84 MMHG | HEART RATE: 77 BPM | SYSTOLIC BLOOD PRESSURE: 124 MMHG
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
## 2020-09-11 DIAGNOSIS — S22.41XA CLOSED FRACTURE OF MULTIPLE RIBS OF RIGHT SIDE, INITIAL ENCOUNTER: ICD-10-CM
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
## 2020-09-11 LAB
ALBUMIN SERPL-MCNC: 3.6 G/DL (ref 3.4–5)
ALP SERPL-CCNC: 74 U/L (ref 40–150)
ALT SERPL W P-5'-P-CCNC: 25 U/L (ref 0–70)
ANION GAP SERPL CALCULATED.3IONS-SCNC: 8 MMOL/L (ref 3–14)
AST SERPL W P-5'-P-CCNC: 23 U/L (ref 0–45)
BASOPHILS # BLD AUTO: 0.1 10E9/L (ref 0–0.2)
BASOPHILS NFR BLD AUTO: 0.5 %
BILIRUB SERPL-MCNC: 0.5 MG/DL (ref 0.2–1.3)
BUN SERPL-MCNC: 18 MG/DL (ref 7–30)
CALCIUM SERPL-MCNC: 8.7 MG/DL (ref 8.5–10.1)
CHLORIDE SERPL-SCNC: 109 MMOL/L (ref 94–109)
CO2 SERPL-SCNC: 21 MMOL/L (ref 20–32)
CREAT SERPL-MCNC: 1.17 MG/DL (ref 0.66–1.25)
DIFFERENTIAL METHOD BLD: ABNORMAL
EOSINOPHIL # BLD AUTO: 0.2 10E9/L (ref 0–0.7)
EOSINOPHIL NFR BLD AUTO: 1.4 %
ERYTHROCYTE [DISTWIDTH] IN BLOOD BY AUTOMATED COUNT: 14 % (ref 10–15)
GFR SERPL CREATININE-BSD FRML MDRD: 66 ML/MIN/{1.73_M2}
GLUCOSE SERPL-MCNC: 97 MG/DL (ref 70–99)
HCT VFR BLD AUTO: 37.3 % (ref 40–53)
HGB BLD-MCNC: 11.7 G/DL (ref 13.3–17.7)
IMM GRANULOCYTES # BLD: 0.1 10E9/L (ref 0–0.4)
IMM GRANULOCYTES NFR BLD: 0.5 %
LYMPHOCYTES # BLD AUTO: 2.4 10E9/L (ref 0.8–5.3)
LYMPHOCYTES NFR BLD AUTO: 21.6 %
MCH RBC QN AUTO: 28.6 PG (ref 26.5–33)
MCHC RBC AUTO-ENTMCNC: 31.4 G/DL (ref 31.5–36.5)
MCV RBC AUTO: 91 FL (ref 78–100)
MONOCYTES # BLD AUTO: 1.3 10E9/L (ref 0–1.3)
MONOCYTES NFR BLD AUTO: 11.7 %
NEUTROPHILS # BLD AUTO: 7 10E9/L (ref 1.6–8.3)
NEUTROPHILS NFR BLD AUTO: 64.3 %
NRBC # BLD AUTO: 0.1 10*3/UL
NRBC BLD AUTO-RTO: 1 /100
PLATELET # BLD AUTO: 253 10E9/L (ref 150–450)
POTASSIUM SERPL-SCNC: 4.3 MMOL/L (ref 3.4–5.3)
PROT SERPL-MCNC: 7.2 G/DL (ref 6.8–8.8)
RBC # BLD AUTO: 4.09 10E12/L (ref 4.4–5.9)
SODIUM SERPL-SCNC: 138 MMOL/L (ref 133–144)
WBC # BLD AUTO: 10.9 10E9/L (ref 4–11)

## 2020-09-11 PROCEDURE — 25000128 H RX IP 250 OP 636: Performed by: EMERGENCY MEDICINE

## 2020-09-11 PROCEDURE — U0003 INFECTIOUS AGENT DETECTION BY NUCLEIC ACID (DNA OR RNA); SEVERE ACUTE RESPIRATORY SYNDROME CORONAVIRUS 2 (SARS-COV-2) (CORONAVIRUS DISEASE [COVID-19]), AMPLIFIED PROBE TECHNIQUE, MAKING USE OF HIGH THROUGHPUT TECHNOLOGIES AS DESCRIBED BY CMS-2020-01-R: HCPCS | Performed by: EMERGENCY MEDICINE

## 2020-09-11 PROCEDURE — C9803 HOPD COVID-19 SPEC COLLECT: HCPCS

## 2020-09-11 PROCEDURE — 71260 CT THORAX DX C+: CPT

## 2020-09-11 PROCEDURE — 85025 COMPLETE CBC W/AUTO DIFF WBC: CPT | Performed by: EMERGENCY MEDICINE

## 2020-09-11 PROCEDURE — 25000132 ZZH RX MED GY IP 250 OP 250 PS 637: Performed by: EMERGENCY MEDICINE

## 2020-09-11 PROCEDURE — 80053 COMPREHEN METABOLIC PANEL: CPT | Performed by: EMERGENCY MEDICINE

## 2020-09-11 PROCEDURE — 99285 EMERGENCY DEPT VISIT HI MDM: CPT | Mod: 25

## 2020-09-11 RX ORDER — HYDROCODONE BITARTRATE AND ACETAMINOPHEN 5; 325 MG/1; MG/1
1 TABLET ORAL EVERY 6 HOURS PRN
Qty: 10 TABLET | Refills: 0 | Status: SHIPPED | OUTPATIENT
Start: 2020-09-11 | End: 2020-09-14

## 2020-09-11 RX ORDER — LIDOCAINE 4 G/G
1 PATCH TOPICAL DAILY PRN
Qty: 15 PATCH | Refills: 0 | Status: SHIPPED | OUTPATIENT
Start: 2020-09-11

## 2020-09-11 RX ORDER — IOPAMIDOL 755 MG/ML
500 INJECTION, SOLUTION INTRAVASCULAR ONCE
Status: COMPLETED | OUTPATIENT
Start: 2020-09-11 | End: 2020-09-11

## 2020-09-11 RX ORDER — ACETAMINOPHEN 500 MG
1000 TABLET ORAL ONCE
Status: COMPLETED | OUTPATIENT
Start: 2020-09-11 | End: 2020-09-11

## 2020-09-11 RX ORDER — IBUPROFEN 600 MG/1
600 TABLET, FILM COATED ORAL EVERY 6 HOURS PRN
Qty: 30 TABLET | Refills: 0 | Status: SHIPPED | OUTPATIENT
Start: 2020-09-11 | End: 2020-10-11

## 2020-09-11 RX ADMIN — IOPAMIDOL 100 ML: 755 INJECTION, SOLUTION INTRAVENOUS at 17:39

## 2020-09-11 RX ADMIN — ACETAMINOPHEN 1000 MG: 500 TABLET, FILM COATED ORAL at 17:25

## 2020-09-11 ASSESSMENT — ENCOUNTER SYMPTOMS
WOUND: 0
NAUSEA: 0
FLANK PAIN: 1
NECK PAIN: 0
CONFUSION: 0
APPETITE CHANGE: 0
WEAKNESS: 0
SHORTNESS OF BREATH: 0
VOMITING: 0
DIZZINESS: 0
LIGHT-HEADEDNESS: 0
NUMBNESS: 0
SEIZURES: 0
ACTIVITY CHANGE: 0
DYSURIA: 0
COUGH: 0
SORE THROAT: 0
HEMATURIA: 0
HEADACHES: 0
FEVER: 0
ABDOMINAL PAIN: 0

## 2020-09-11 ASSESSMENT — MIFFLIN-ST. JEOR: SCORE: 1982.87

## 2020-09-11 NOTE — ED AVS SNAPSHOT
[facility] Emergency Department  [address]  [facility] [address]  Phone:  [phone]  Fax:  [phone]                                    Eran Almeida   MRN: [id number]    Department:  [facility] Emergency Department   Date of Visit:  9/11/2020           After Visit Summary Signature Page    I have received my discharge instructions, and my questions have been answered. I have discussed any challenges I see with this plan with the nurse or doctor.    ..........................................................................................................................................  Patient/Patient Representative Signature      ..........................................................................................................................................  Patient Representative Print Name and Relationship to Patient    ..................................................               ................................................  Date                                   Time    ..........................................................................................................................................  Reviewed by Signature/Title    ...................................................              ..............................................  Date                                               Time          22EPIC Rev 08/18

## 2020-09-11 NOTE — DISCHARGE INSTRUCTIONS
POSSIBLE COVID-19 INFECTION. THE SCAN OF YOUR LUNGS SHOWS SMALL AREAS OF POSSIBLE INFECTION. THIS COULD BE CAUSED BY CORONAVIRUS. AT THIS TIME THERE IS NO SIGN OF A BACTERIA CAUSING PNEUMONIA. RETURN TO THE EMERGENCY DEPARTMENT IMMEDIATELY FOR ANY WORSENING SHORTNESS OF BREATH, DIFFICULTY BREATHING, FEVER >100.4 OR ANY OTHER WORSENING.

## 2020-09-11 NOTE — ED PROVIDER NOTES
History     Chief Complaint:  Rib Pain    HPI   Eran Almeida is a 65 year old male who presents with right thoracic wall pain after slipping and falling while getting out of the shower prior to arrival. The patient reports he slipped due to being wet and the floor being slippery. He denies any prodromal symptoms. No syncope or loss of consciousness. He denies hitting his head. He denies any neck pain. He denies shortness of breath, anterior chest pain or pain with breathing. He reports that his pain is on the right flank and is worse with movement. He denies anterior abdominal pain. He denies any injuries to the extremities. No numbness tingling or weakness. He was ambulatory after the fall. He denies any recent illness, fever. He denies any bleeding or wounds. No other symptoms at this time.    Allergies:  No Known Drug Allergies    Medications:    Anafranil  Klonopin  Symmetrel  Prilosec  Adderall XR  Lipitor  Zyprexa    Past Medical History:    Anxiety  Spastic torticollis  Dysarthria  Cancer  Coronary artery disease   Depression  Gastroesophageal reflux disease   Hearing problem  Hyperlipidemia   Reduced vision  Sleep apnea  Lumbago  Cervicalgia  Testicular hypofunction  Lumbosacral radiculitis  Rotator cuff syndrome  Malignant neoplasm of posterior wall of urinary bladder  Prostatitis  Hyperreflexia  Fecal incontinence  Urinary incontinence  TBI  Oral phase dysphagia  Tarsorrhaphy  Dysphonia    Past Surgical History:    Stabismus/amblyopia    Family History:    Brother- cancer  Father- heart disease, depression    Social History:  Smoking status: Former, quit 1996  Alcohol use: Socially, 5 drinks  Drug use: No  PCP: Vinicius Bray  Presents to the ED by himself  Marital Status:   [2]    Review of Systems   Constitutional: Negative for activity change, appetite change and fever.   HENT: Negative for congestion and sore throat.    Respiratory: Negative for cough and shortness of breath.   "  Cardiovascular: Negative for chest pain.   Gastrointestinal: Negative for abdominal pain, nausea and vomiting.   Genitourinary: Positive for flank pain. Negative for dysuria and hematuria.   Musculoskeletal: Negative for neck pain.   Skin: Negative for rash and wound.   Neurological: Negative for dizziness, seizures, syncope, weakness, light-headedness, numbness and headaches.   Psychiatric/Behavioral: Negative for confusion.   All other systems reviewed and are negative.    Physical Exam     Patient Vitals for the past 24 hrs:   BP Temp Pulse Resp SpO2 Height Weight   09/11/20 1802 124/84 -- 77 18 100 % -- --   09/11/20 1644 (!) 148/89 -- 78 18 98 % -- --   09/11/20 1601 (!) 128/109 -- -- -- -- -- --   09/11/20 1559 -- 97  F (36.1  C) 76 16 99 % 1.803 m (5' 11\") 116.6 kg (257 lb)        Physical Exam  General: Well appearing, nontoxic. Resting comfortably  Head:  Scalp, face, and head appear normal, atraumatic, non tender to palpation  Eyes:  Pupils are equal, round    Conjunctivae non-injected and sclerae white  ENT:    The external nose is normal    Pinnae are normal    The oropharynx is normal, mucous membranes moist    Uvula is in the midline  Neck:  Normal range of motion. Cervical spine nontender, no stepoffs    There is no rigidity noted    Trachea is in the midline  CV:  Regular rate and rhythm     Normal S1/S2, no S3/S4    No murmur or rub. Radial pulses 2+ bilaterally   Resp:  Lungs are clear and equal bilaterally    There is no tachypnea    No increased work of breathing    No rales, wheezing, or rhonchi  GI:  Abdomen is soft, obese, no rigidity or guarding    No distension, or mass    No tenderness or rebound tenderness   MS:  Normal muscular tone    Significant tenderness to palpation with early ecchymosis to the right flank.  No soft tissue crepitus.  No palpable bony deformity.  No wounds.  Moderate tenderness to the right lumbar soft tissues and muscular tissue.  No focal bony spine tenderness or " stepoffs.     Symmetric motor strength    No lower extremity edema  Skin:  No rash or acute skin lesions noted  Neuro: A&Ox3, GCS 15    CN II - XII intact    Speech clear, fluent, and normal    Strength 5/5 and symmetric in bilateral upper and lower extremities.    SILT throughout.    No tremor.     Gait slow, but otherwise normal    No meningismus   Psych:  Normal affect.  Appropriate interactions.    Emergency Department Course     Imaging:  Radiographic findings were communicated with the patient who voiced understanding of the findings.  CT Chest/Abdomen/Pelvis w/o Contrast, per radiology:  1.  Acute fractures right ninth, 10th, and 11th ribs.   2.  Small right pleural effusion and right basilar atelectasis.   3.  No other evidence for acute traumatic injury within the chest, abdomen or pelvis.   4.  Groundglass opacities within both upper lobes concerning for pneumonia, including Covid 19. Clinical correlation recommended.    Laboratory:  CBC: WBC 10.9, HGB 11.7 (L),   CMP: WNL (Creatinine 1.17)    Symptomatic COVID-19 Virus (Coronavirus) by PCR: Pending     Interventions:  1725: Tylenol 1,000 mg PO    Emergency Department Course:  Past medical records, nursing notes, and vitals reviewed.  1607: I performed an exam of the patient and obtained history, as documented above.     1642: IV inserted and blood drawn.     1738: The patient was sent for a chest/abdomen/pelvis CT while in the emergency department, findings above.     1750: The patient's nose and throat were swabbed and this sample was sent for COVID testing, findings above.      1835: Findings and plan explained to the Patient. Patient discharged home with instructions regarding supportive care, medications, and reasons to return. The importance of close follow-up was reviewed.      Impression & Plan      Medical Decision Making:  Eran Almeida is a 65 year old male who presents for evaluation of right thoracic wall pain after slipping and  falling at home. On my evaluation he is well appearing, hemodynamically stable, afebrile and without increased work of breathing or resp distress. He denies LOC, syncope. There are no focal neurologic deficits. Workup in the ED reveals multiple acute right sided rib fractures. No hemo/pneumothorax, lung contusion, or solid organ/abdominal injuries. The patient denies any shortness of breath. Lab studies are reassuring. CT chest did reveal scattered ground glass opacities which could represent an infectious etiology however the patient denies any chest pain, cough, fever, sore throat or any other infectious symptoms. He reports he was feeling completely normal prior to the fall. COVID testing was sent. No indication for antibiotics at this time. I recommended supportive care with analgesics, incentive spirometry and very close PCP follow up. The patient should return to the ED immediately for any shortness of breath, increased work of breathing, fever, or any other worsening. The patient and his family member are agreeable with the plan of care. Return precautions were discussed with patient. The patient's questions were answered and the patient was agreeable with discharge. The patient was discharged in stable condition.    Covid-19  Eran Almeida was evaluated during a global COVID-19 pandemic, which necessitated consideration that the patient might be at risk for infection with the SARS-CoV-2 virus that causes COVID-19.   Applicable protocols for evaluation were followed during the patient's care.   COVID-19 was considered as part of the patient's evaluation. The plan for testing is:  a test was obtained during this visit.      Diagnosis:    ICD-10-CM    1. Fall at home, initial encounter  W19.XXXA     Y92.009    2. Closed fracture of multiple ribs of right side, initial encounter  S22.41XA        Disposition:  Discharged to home.      Discharge Medications:  New Prescriptions    HYDROCODONE-ACETAMINOPHEN (NORCO)  5-325 MG TABLET    Take 1 tablet by mouth every 6 hours as needed for severe pain    IBUPROFEN (ADVIL/MOTRIN) 600 MG TABLET    Take 1 tablet (600 mg) by mouth every 6 hours as needed for fever or pain Take with food.    LIDOCAINE (LIDOCARE) 4 % PATCH    Place 1 patch onto the skin daily as needed for moderate pain (musculoskeletal pain) Remove patch after 12 hours. To prevent lidocaine toxicity, patient should be patch free for 12 hrs daily.       Sosa Fagan  9/11/2020   [facility] EMERGENCY DEPARTMENT    ISosa, am serving as a scribe at 4:15 PM on 9/11/2020 to document services personally performed by Eran Weaver MD based on my observations and the provider's statements to me.         Eran Weaver MD  09/12/20 9625

## 2020-09-11 NOTE — ED TRIAGE NOTES
Pt slipped and fell when getting out of the shower this am.  Hit ribs on the threshold of the shower.  C/o pain in the mid/low right ribs.  Denies use of blood thinners.

## 2020-09-12 LAB
SARS-COV-2 RNA SPEC QL NAA+PROBE: NOT DETECTED
SPECIMEN SOURCE: NORMAL

## 2020-09-14 ENCOUNTER — NURSE TRIAGE (OUTPATIENT)
Dept: NURSING | Facility: CLINIC | Age: 65
End: 2020-09-14

## 2020-09-14 NOTE — TELEPHONE ENCOUNTER

## 2020-10-12 ENCOUNTER — TELEPHONE (OUTPATIENT)
Dept: OTOLARYNGOLOGY | Facility: CLINIC | Age: 65
End: 2020-10-12

## 2020-10-12 NOTE — TELEPHONE ENCOUNTER
Patients wife called in regarding upcoming surgery with Dr. Audrey Waller. Pushpa explains that patient has a lot of appts and wishes to cancel surgery at this time.  SHAWN Orlando was informed and will follow up with Pushpa on passing message along to Dr. Audrey Waller.     Shala Hernandez  10/12/20  10:44 AM

## 2020-11-06 ENCOUNTER — OFFICE VISIT (OUTPATIENT)
Dept: PLASTIC SURGERY | Facility: CLINIC | Age: 65
End: 2020-11-06
Payer: COMMERCIAL

## 2020-11-06 DIAGNOSIS — G24.5 BLEPHAROSPASM: ICD-10-CM

## 2020-11-06 DIAGNOSIS — G51.33 HEMIFACIAL SPASM AFFECTING BOTH SIDES OF FACE: Primary | ICD-10-CM

## 2020-11-06 NOTE — PROGRESS NOTES
Facial Plastic and Reconstructive Surgery      Eran Almeida presents today for evaluation of facial spasm. The patient has had the morbidity of facial nerve dysfunction and has significant morbidity, tightness and discomfort. Involuntary movement is also present around the eye and mouth which leads to discomfort, tightness and fatigue.     PMH, PSH, meds and allergies are unchanged.    Assessment:  Clonic Hemifacial spasm  Spastic torticollis  Incomplete facial paralysis  Blepharospasm    Plan:   Chemodenervation of eye, face and neck today.      Procedure: Chemodenervation with Botulinum Toxin A  Indication: Hemifacial Spasm and Hypercontracture  Injector: Kaci Trevino MD      Informed consent was obtained.  The skin was cleaned with antimicrobial solution and a topical ice was placed.     The patient was asked to systematically engage the muscles in the area to be injected. The tuberculin needles were used for subdermal injection and hemostasis was obtained with light digital pressure when needed. The skin was cleaned.     A total of 45 units were injected.  Botulinum toxin A type: Botox    Please see procedure log.    The patient tolerated the procedure well and there were no complications. Post procedure care instructions were given to the patient.

## 2020-11-06 NOTE — LETTER
11/6/2020       RE: Eran Almeida  [address]  [facility] [address]     Dear Colleague,    Thank you for referring your patient, Eran Almeida, to the [facility]. Please see a copy of my visit note below.    Facial Plastic and Reconstructive Surgery      Eran Almeida presents today for evaluation of facial spasm. The patient has had the morbidity of facial nerve dysfunction and has significant morbidity, tightness and discomfort. Involuntary movement is also present around the eye and mouth which leads to discomfort, tightness and fatigue.     PMH, PSH, meds and allergies are unchanged.    Assessment:  Clonic Hemifacial spasm  Spastic torticollis  Incomplete facial paralysis  Blepharospasm    Plan:   Chemodenervation of eye, face and neck today.      Procedure: Chemodenervation with Botulinum Toxin A  Indication: Hemifacial Spasm and Hypercontracture  Injector: Kaci Trevino MD      Informed consent was obtained.  The skin was cleaned with antimicrobial solution and a topical ice was placed.     The patient was asked to systematically engage the muscles in the area to be injected. The tuberculin needles were used for subdermal injection and hemostasis was obtained with light digital pressure when needed. The skin was cleaned.     A total of 45 units were injected.  Botulinum toxin A type: Botox    Please see procedure log.    The patient tolerated the procedure well and there were no complications. Post procedure care instructions were given to the patient.    Again, thank you for allowing me to participate in the care of your patient.      Sincerely,    Kaci Trevino MD

## 2020-11-06 NOTE — LETTER
November 6, 2020  Re: Eran Almeida  1955    Dear Dr. Reyes,    Thank you so much for referring Eran Almeida to the [facility]. I had the pleasure of visiting with Eran today.     Attached you will find a copy of my note. Please feel free to reach out to me with any questions, [phone].     Facial Plastic and Reconstructive Surgery      Eran Almeida presents today for evaluation of facial spasm. The patient has had the morbidity of facial nerve dysfunction and has significant morbidity, tightness and discomfort. Involuntary movement is also present around the eye and mouth which leads to discomfort, tightness and fatigue.     PMH, PSH, meds and allergies are unchanged.    Assessment:  Clonic Hemifacial spasm  Spastic torticollis  Incomplete facial paralysis  Blepharospasm    Plan:   Chemodenervation of eye, face and neck today.      Procedure: Chemodenervation with Botulinum Toxin A  Indication: Hemifacial Spasm and Hypercontracture  Injector: Kaci Trevino MD      Informed consent was obtained.  The skin was cleaned with antimicrobial solution and a topical ice was placed.     The patient was asked to systematically engage the muscles in the area to be injected. The tuberculin needles were used for subdermal injection and hemostasis was obtained with light digital pressure when needed. The skin was cleaned.     A total of 45 units were injected.  Botulinum toxin A type: Botox    Please see procedure log.    The patient tolerated the procedure well and there were no complications. Post procedure care instructions were given to the patient.    Your trust in our practice and care is much appreciated.    Sincerely,  Kaci Trevino MD

## 2020-11-29 ENCOUNTER — HEALTH MAINTENANCE LETTER (OUTPATIENT)
Age: 65
End: 2020-11-29

## 2021-02-12 ENCOUNTER — OFFICE VISIT (OUTPATIENT)
Dept: PLASTIC SURGERY | Facility: CLINIC | Age: 66
End: 2021-02-12
Payer: COMMERCIAL

## 2021-02-12 DIAGNOSIS — G51.0 FACIAL PARALYSIS: Primary | ICD-10-CM

## 2021-02-12 DIAGNOSIS — G51.33 HEMIFACIAL SPASM AFFECTING BOTH SIDES OF FACE: ICD-10-CM

## 2021-02-12 NOTE — PROGRESS NOTES
Facial Plastic and Reconstructive Surgery      Eran Almeida presents today for evaluation of facial spasm. The patient has had the morbidity of facial nerve dysfunction and has significant morbidity, tightness and discomfort. Involuntary movement is also present around the eye and mouth which leads to discomfort, tightness and fatigue.     PMH, PSH, meds and allergies are unchanged.      Assessment:  Clonic Hemifacial spasm, bilateral   Spastic torticollis  Incomplete facial paralysis  Blepharospasm    Plan:   Chemodenervation of eye, face and neck today.      Procedure: Chemodenervation with Botulinum Toxin A  Indication: Hemifacial Spasm and Hypercontracture  Injector: Kaci Treivno MD      Informed consent was obtained.  The skin was cleaned with antimicrobial solution and a topical ice was placed.     The patient was asked to systematically engage the muscles in the area to be injected. The tuberculin needles were used for subdermal injection and hemostasis was obtained with light digital pressure when needed. The skin was cleaned.     A total of 45 units were injected.  Botulinum toxin A type: Botox    Please see procedure log.    The patient tolerated the procedure well and there were no complications. Post procedure care instructions were given to the patient.

## 2021-02-12 NOTE — LETTER
2/12/2021       RE: Eran Almeida  [address]  [facility] [address]     Dear Colleague,    Thank you for referring your patient, Eran Almeida, to the [facility] [place] [facility] at [facility]. Please see a copy of my visit note below.    Facial Plastic and Reconstructive Surgery      Eran Almeida presents today for evaluation of facial spasm. The patient has had the morbidity of facial nerve dysfunction and has significant morbidity, tightness and discomfort. Involuntary movement is also present around the eye and mouth which leads to discomfort, tightness and fatigue.     PMH, PSH, meds and allergies are unchanged.      Assessment:  Clonic Hemifacial spasm, bilateral   Spastic torticollis  Incomplete facial paralysis  Blepharospasm    Plan:   Chemodenervation of eye, face and neck today.      Procedure: Chemodenervation with Botulinum Toxin A  Indication: Hemifacial Spasm and Hypercontracture  Injector: Kaci Trevino MD      Informed consent was obtained.  The skin was cleaned with antimicrobial solution and a topical ice was placed.     The patient was asked to systematically engage the muscles in the area to be injected. The tuberculin needles were used for subdermal injection and hemostasis was obtained with light digital pressure when needed. The skin was cleaned.     A total of 45 units were injected.  Botulinum toxin A type: Botox    Please see procedure log.    The patient tolerated the procedure well and there were no complications. Post procedure care instructions were given to the patient.    Again, thank you for allowing me to participate in the care of your patient.      Sincerely,    Kaci Trevino MD

## 2021-02-12 NOTE — LETTER
February 12, 2021  Re: Eran Almeida  1955    Dear Dr. Reyes,    Thank you so much for referring Eran Almeida to the [facility]. I had the pleasure of visiting with Eran today.     Attached you will find a copy of my note. Please feel free to reach out to me with any questions, [phone].     Facial Plastic and Reconstructive Surgery      Eran Almeida presents today for evaluation of facial spasm. The patient has had the morbidity of facial nerve dysfunction and has significant morbidity, tightness and discomfort. Involuntary movement is also present around the eye and mouth which leads to discomfort, tightness and fatigue.     PMH, PSH, meds and allergies are unchanged.      Assessment:  Clonic Hemifacial spasm, bilateral   Spastic torticollis  Incomplete facial paralysis  Blepharospasm    Plan:   Chemodenervation of eye, face and neck today.      Procedure: Chemodenervation with Botulinum Toxin A  Indication: Hemifacial Spasm and Hypercontracture  Injector: Kaci Trevino MD      Informed consent was obtained.  The skin was cleaned with antimicrobial solution and a topical ice was placed.     The patient was asked to systematically engage the muscles in the area to be injected. The tuberculin needles were used for subdermal injection and hemostasis was obtained with light digital pressure when needed. The skin was cleaned.     A total of 45 units were injected.  Botulinum toxin A type: Botox    Please see procedure log.    The patient tolerated the procedure well and there were no complications. Post procedure care instructions were given to the patient.        Your trust in our practice and care is much appreciated.    Sincerely,  Kaci Trevino MD

## 2021-03-12 ENCOUNTER — TELEPHONE (OUTPATIENT)
Dept: OTOLARYNGOLOGY | Facility: CLINIC | Age: 66
End: 2021-03-12

## 2021-03-12 DIAGNOSIS — G24.3 SPASMODIC TORTICOLLIS: Primary | ICD-10-CM

## 2021-03-12 RX ORDER — CEFAZOLIN SODIUM 2 G/50ML
2 SOLUTION INTRAVENOUS SEE ADMIN INSTRUCTIONS
Status: CANCELLED | OUTPATIENT
Start: 2021-03-12

## 2021-03-12 RX ORDER — CEFAZOLIN SODIUM 2 G/50ML
2 SOLUTION INTRAVENOUS
Status: CANCELLED | OUTPATIENT
Start: 2021-03-12

## 2021-03-12 NOTE — TELEPHONE ENCOUNTER
Returned call to wife re: scheduling pt's surgery for Platysma Myectomy.    Wife said that pt is ready to proceed with surgery.    Pt given Shala's (surgery scheduler) phone number.    Johanny Perales RN  3/12/2021 2:13 PM

## 2021-03-12 NOTE — TELEPHONE ENCOUNTER
[facility] Call Center    Phone Message    May a detailed message be left on voicemail: yes     Reason for Call: Other: Pt wife Pushpa Lopez called, states Dr. Trevino said her nurse would call to schedule the surgery, but they haven't received a call yet. Offered to transfer to Shala Hernandez, but she wanted to have a message sent to the      Action Taken: Message routed to:  [facility] ([facility]    Travel Screening: Not Applicable

## 2021-03-15 PROBLEM — G24.3 SPASMODIC TORTICOLLIS: Status: ACTIVE | Noted: 2021-03-15

## 2021-03-15 NOTE — TELEPHONE ENCOUNTER
Left message regarding scheduling surgery/procedure with Dr. Audrey Waller.   Writer left call back number on the patients voicemail.     Shala Hernandez on 3/15/2021 at 1:53 PM   P: [phone]

## 2021-03-15 NOTE — TELEPHONE ENCOUNTER
Patients wife called back to schedule surgery with Dr. Audrey Waller     Date of Surgery: 06/03/2021  Location of surgery: [facility]  Pre-Op H&P: PAC appt scheduled for 5/11   Post-Op Appt Date: 1 week RN    Imaging needed:  No  Discussed COVID-19 testing:  Yes  Pre-cert/Authorization completed:  Review completed in 2021     Pushpa Lopez understands that PAC will provide arrival time and instructions for surgery. Will return 1 week after surgery for post op. Will be contacted via phone call for covid-19 testing.   No further questions.

## 2021-03-23 ENCOUNTER — TRANSFERRED RECORDS (OUTPATIENT)
Dept: HEALTH INFORMATION MANAGEMENT | Facility: CLINIC | Age: 66
End: 2021-03-23

## 2021-03-30 NOTE — TELEPHONE ENCOUNTER
Records received 03/30/21    Facility  [facility]   Outcome 3/23/2021 ECG tracing sent to scan

## 2021-03-30 NOTE — TELEPHONE ENCOUNTER
FUTURE VISIT INFORMATION      SURGERY INFORMATION:    Date: 6.3.21    Location[place]    Surgeon:  Uriel    Anesthesia Type:  combined MAc with local    Procedure: Left Platysma Myectomy    Consult:  2.12.21    RECORDS REQUESTED FROM:       Primary Care Provider: Asa    Most recent EKG+ Tracing: 3.23.21 HP    Most recent ECHO: 7.21.20 HP    Action 3.30.21 MJ   Action Taken Requested EKG strips from

## 2021-05-10 ENCOUNTER — ANESTHESIA EVENT (OUTPATIENT)
Dept: SURGERY | Facility: CLINIC | Age: 66
End: 2021-05-10

## 2021-05-11 ENCOUNTER — TELEPHONE (OUTPATIENT)
Dept: OTOLARYNGOLOGY | Facility: CLINIC | Age: 66
End: 2021-05-11

## 2021-05-11 ENCOUNTER — PRE VISIT (OUTPATIENT)
Dept: SURGERY | Facility: CLINIC | Age: 66
End: 2021-05-11

## 2021-05-11 ENCOUNTER — OFFICE VISIT (OUTPATIENT)
Dept: SURGERY | Facility: CLINIC | Age: 66
End: 2021-05-11
Payer: COMMERCIAL

## 2021-05-11 VITALS
BODY MASS INDEX: 35.14 KG/M2 | SYSTOLIC BLOOD PRESSURE: 119 MMHG | TEMPERATURE: 98.3 F | HEIGHT: 71 IN | OXYGEN SATURATION: 94 % | RESPIRATION RATE: 12 BRPM | HEART RATE: 92 BPM | DIASTOLIC BLOOD PRESSURE: 83 MMHG | WEIGHT: 251 LBS

## 2021-05-11 DIAGNOSIS — Z01.818 PREOP EXAMINATION: Primary | ICD-10-CM

## 2021-05-11 DIAGNOSIS — E66.01 MORBID OBESITY (H): ICD-10-CM

## 2021-05-11 PROCEDURE — 99204 OFFICE O/P NEW MOD 45 MIN: CPT | Performed by: PHYSICIAN ASSISTANT

## 2021-05-11 RX ORDER — MULTIVIT-MIN/IRON/FOLIC ACID/K 18-600-40
CAPSULE ORAL EVERY MORNING
COMMUNITY

## 2021-05-11 RX ORDER — IBUPROFEN 200 MG
200 TABLET ORAL EVERY 4 HOURS PRN
COMMUNITY

## 2021-05-11 RX ORDER — MULTIVITAMIN WITH IRON
1 TABLET ORAL EVERY EVENING
COMMUNITY

## 2021-05-11 RX ORDER — OMEGA-3 FATTY ACIDS/FISH OIL 300-1000MG
CAPSULE ORAL 2 TIMES DAILY
COMMUNITY

## 2021-05-11 RX ORDER — ROSUVASTATIN CALCIUM 40 MG/1
40 TABLET, COATED ORAL AT BEDTIME
COMMUNITY
Start: 2021-04-16

## 2021-05-11 ASSESSMENT — MIFFLIN-ST. JEOR: SCORE: 1950.66

## 2021-05-11 ASSESSMENT — ENCOUNTER SYMPTOMS
DYSRHYTHMIAS: 1
SEIZURES: 0

## 2021-05-11 ASSESSMENT — COPD QUESTIONNAIRES: COPD: 0

## 2021-05-11 ASSESSMENT — PAIN SCALES - GENERAL: PAINLEVEL: NO PAIN (0)

## 2021-05-11 ASSESSMENT — LIFESTYLE VARIABLES: TOBACCO_USE: 1

## 2021-05-11 NOTE — H&P (VIEW-ONLY)
Pre-Operative H & P     CC:  Preoperative exam to assess for increased cardiopulmonary risk while undergoing surgery and anesthesia.    Date of Encounter: 5/11/2021  Primary Care Physician:  Vinicius Bray  Associated Diagnosis: spasmodic torticollis    HPI  Eran Almeida is a 66 year old male who presents for pre-operative H & P in preparation for left platysma myectomy with Dr. Trevino on 6/3/21 at [facility].     This is a 66-year-old male with past medical history significant for dyslipidemia, PATTIE on CPAP, traumatic brain injury suffered May 12, 2016 with subsequent facial nerve dysfunction and spastic torticollis, GERD, depression, anxiety, and bladder cancer status post tumor resection in 2010.    Patient has facial spasm as a result of his traumatic brain injury.  This causes significant morbidity such as tightness and discomfort.  He does have some involuntary movement around his right eye and his mouth.  He has undergone chemodenervation of the right eye, face and neck with botulism toxin a with Dr. Audrey Waller.    History is obtained from the patient, his wife, and the medical record.    Past Medical History  Past Medical History:   Diagnosis Date     Anxiety ~8 months     Cancer (H) bladder ~8 years    Tumor removed by Dr. Cabrera     Coronary artery disease     potential. On crestor     Depressive disorder ~10 years     Gastroesophageal reflux disease ~ 10 years    omeprezole     Head injury May 12, 2016    occurred May 12, 2016 during accdent     Hearing problem May 12, 2016    occurred May12, 2016 during accicent     Mixed hyperlipidemia 1998    Hyperlipidemia     Reduced vision May 12, 2016    occurred during May 12, 2016 accident     Sensorineural hearing loss May 12 -    after accident     Sleep apnea        Past Surgical History  Past Surgical History:   Procedure Laterality Date     COLONOSCOPY       EYE SURGERY      platinum weight, right eye 2016      Z NONSPECIFIC PROCEDURE  1962    stabismus/amblyopia       Hx of Blood transfusions/reactions: denies     Hx of abnormal bleeding or anti-platelet use: denies    Menstrual history: No LMP for male patient.:     Steroid use in the last year: denies    Personal or FH with difficulty with Anesthesia:  denies    Prior to Admission Medications  Current Outpatient Medications   Medication Sig Dispense Refill     clomiPRAMINE (ANAFRANIL) 75 MG capsule Take 200 mg by mouth At Bedtime        clonazePAM (KLONOPIN) 0.5 MG tablet Take 1 mg by mouth 2 times daily        DHA-EPA-VITAMIN E PO Take by mouth every morning        ibuprofen (ADVIL/MOTRIN) 200 MG tablet Take 200 mg by mouth every 4 hours as needed for mild pain       magnesium 250 MG tablet Take 1 tablet by mouth every evening       MULTIVITAMIN TABS   OR 1 po qd (Patient taking differently: 3 times daily )       OLANZapine (ZYPREXA) 5 MG tablet Take 5 mg by mouth 3 times daily        omega 3 1000 MG CAPS Take by mouth 2 times daily       omeprazole (PRILOSEC) 40 MG capsule every morning        rosuvastatin (CRESTOR) 40 MG tablet Take 40 mg by mouth At Bedtime        sildenafil (VIAGRA) 100 MG cap/tab as needed        Vitamin D, Cholecalciferol, 25 MCG (1000 UT) TABS Take by mouth every morning       amantadine (SYMMETREL) 100 MG capsule Take 100 mg by mouth        amphetamine-dextroamphetamine (ADDERALL XR) 20 MG per 24 hr capsule Take 20 mg by mouth daily        Atorvastatin Calcium (LIPITOR PO)        Lidocaine (LIDOCARE) 4 % Patch Place 1 patch onto the skin daily as needed for moderate pain (musculoskeletal pain) Remove patch after 12 hours. To prevent lidocaine toxicity, patient should be patch free for 12 hrs daily. (Patient taking differently: Place 1 patch onto the skin daily as needed for moderate pain (musculoskeletal pain) Remove patch after 12 hours. To prevent lidocaine toxicity, patient should be patch free for 12 hrs daily.) 15 patch 0     LIPITOR 20  MG OR TABS 1 tab PO QD (Once per day) (Patient taking differently: No sig reported)       VITAMIN E 400 IU OR CAPS  1 po qd (Patient taking differently: No sig reported)         Allergies  Allergies   Allergen Reactions     No Known Drug Allergies        Social History  Social History     Socioeconomic History     Marital status:      Spouse name: john lentz     Number of children: 5     Years of education: 18     Highest education level: Not on file   Occupational History     Not on file   Social Needs     Financial resource strain: Not on file     Food insecurity     Worry: Not on file     Inability: Not on file     Transportation needs     Medical: Not on file     Non-medical: Not on file   Tobacco Use     Smoking status: Former Smoker     Packs/day: 0.50     Years: 2.00     Pack years: 1.00     Types: Cigarettes     Start date: 1993     Quit date: 10/7/1996     Years since quittin.6     Smokeless tobacco: Never Used     Tobacco comment: quit age 32   Substance and Sexual Activity     Alcohol use: No     Alcohol/week: 5.0 standard drinks     Comment: socially     Drug use: No     Sexual activity: Yes     Partners: Female     Birth control/protection: Post-menopausal   Lifestyle     Physical activity     Days per week: Not on file     Minutes per session: Not on file     Stress: Not on file   Relationships     Social connections     Talks on phone: Not on file     Gets together: Not on file     Attends Bahai service: Not on file     Active member of club or organization: Not on file     Attends meetings of clubs or organizations: Not on file     Relationship status: Not on file     Intimate partner violence     Fear of current or ex partner: Not on file     Emotionally abused: Not on file     Physically abused: Not on file     Forced sexual activity: Not on file   Other Topics Concern      Service Not Asked     Blood Transfusions Not Asked     Caffeine Concern Yes     Comment: 1-2 cups qd  "    Occupational Exposure Not Asked     Hobby Hazards Not Asked     Sleep Concern Not Asked     Stress Concern Not Asked     Weight Concern Not Asked     Special Diet Not Asked     Back Care Not Asked     Exercise Yes     Comment: deshaun raquetbrambo     Bike Helmet Not Asked     Seat Belt Yes     Self-Exams Not Asked   Social History Narrative     Not on file       Family History  Family History   Problem Relation Age of Onset     Breast Cancer Maternal Grandmother      Cancer Brother         ceased     Heart Disease Father         ceased     Depression Father            Anesthesia Evaluation   Pt has had prior anesthetic.     No history of anesthetic complications       ROS/MED HX  ENT/Pulmonary:     (+) sleep apnea, uses CPAP, tobacco use, Past use,  (-) asthma and COPD   Neurologic: Comment: H/o TBI 2016, fell down concrete steps  Clonic hemifacial spasm  Incomplete facial paralysis   (-) no seizures and no CVA   Cardiovascular:     (+) Dyslipidemia -----dysrhythmias, Long QT,  (-) hypertension   METS/Exercise Tolerance: 4 - Raking leaves, gardening    Hematologic:  - neg hematologic  ROS  (-) history of blood clots and history of blood transfusion   Musculoskeletal: Comment: Spastic torticollis  blepharospasm      GI/Hepatic:     (+) GERD,  (-) liver disease   Renal/Genitourinary:  - neg Renal ROS     Endo:     (+) Obesity,  (-) Type I DM, Type II DM and thyroid disease   Psychiatric/Substance Use:     (+) psychiatric history anxiety and depression     Infectious Disease:  - neg infectious disease ROS     Malignancy:   (+) Malignancy, History of Other.Other CA bladder ~ 2010 status post Surgery.    Other:  - neg other ROS          The complete review of systems is negative other than noted in the HPI or here.   Temp: 98.3  F (36.8  C) Temp src: Oral BP: 119/83 Pulse: 92   Resp: 12 SpO2: 94 %         251 lbs 0 oz  5' 11\"[PT REOPORTED[   Body mass index is 35.01 kg/m .       Physical Exam  Constitutional: Awake, " alert, cooperative, no apparent distress, and appears stated age.  Eyes: Pupils equal, round and reactive to light, extra ocular muscles intact, sclera clear, conjunctiva normal.  HENT: Normocephalic, oral pharynx with moist mucus membranes, good dentition. No goiter appreciated.   Respiratory: Clear to auscultation bilaterally, no crackles or wheezing.  Cardiovascular: Regular rate and rhythm, normal S1 and S2, and no murmur noted.  Carotids +2, no bruits. No edema. Palpable pulses to radial  DP and PT arteries.   GI: not assessed  Lymph/Hematologic: No cervical lymphadenopathy and no supraclavicular lymphadenopathy.  Genitourinary:  deferred  Skin: Warm and dry.  No rashes at anticipated surgical site.   Musculoskeletal: Full ROM of neck. There is no redness, warmth, or swelling of the joints. Gross motor strength is normal.    Neurologic: Awake, alert, oriented to name, place and time. Cranial nerves II-XII are grossly intact.   Neuropsychiatric: Calm, cooperative. Normal affect.     PRIOR LABS/DIAGNOSTIC STUDIES:  All labs and imaging personally reviewed    Component      Latest Ref Rng & Units 2020   Sodium      133 - 144 mmol/L 138   Potassium      3.4 - 5.3 mmol/L 4.3   Chloride      94 - 109 mmol/L 109   Carbon Dioxide      20 - 32 mmol/L 21   Anion Gap      3 - 14 mmol/L 8   Glucose      70 - 99 mg/dL 97   Urea Nitrogen      7 - 30 mg/dL 18   Creatinine      0.66 - 1.25 mg/dL 1.17   GFR Estimate      >60 mL/min/1.73:m2 66   GFR Estimate If Black      >60 mL/min/1.73:m2 76   Calcium      8.5 - 10.1 mg/dL 8.7   Bilirubin Total      0.2 - 1.3 mg/dL 0.5   Albumin      3.4 - 5.0 g/dL 3.6   Protein Total      6.8 - 8.8 g/dL 7.2   Alkaline Phosphatase      40 - 150 U/L 74   ALT      0 - 70 U/L 25   AST      0 - 45 U/L 23       EKG: Personally reviewed but formal cardiology read pendin21  Sinus rhythm  Minimal voltage criteria for LVH, may be normal variant  Borderline ECG  When compared with ECG of  "23-MAR-2021 13:16,  No significant change was found    Cardiac echo: 7/21/20  CONCLUSIONS  Left ventricular ejection fraction is visually estimated at 60%.  Upper limits of normal to mild left ventricular dilation is present.  No significant valvular abnormalities were identified.  Normal right ventricle size and normal global function.  No previous study available for comparison.      FINDINGS    MITRAL VALVE  Normal mitral valve structure and function.  Trace (physiologic) mitral regurgitation.    AORTIC VALVE  Normal aortic valve structure and function.  The aortic valve is tricuspid.    TRICUSPID VALVE  Normal tricuspid valve structure, but trace tricuspid regurgitation.  Pulmonary artery pressures cannot be estimated due to absence of  adequate TR jet.    PULMONIC VALVE  Normal pulmonic valve structure and function.    LEFT ATRIUM  Left atrial volume index is 20.3 mL/m^2. (Normal <34mL/m^2).    LEFT VENTRICLE  Upper limits of normal to mild left ventricular dilation is present.  Normal left ventricular wall thickness.  Thickening of the anteroseptal septum [\"sigmoid septum\"] is present.  Left ventricular ejection fraction is visually estimated at 60%.  Normal left ventricular diastolic function.    RIGHT ATRIUM  Normal right atrium.    RIGHT VENTRICLE  Normal right ventricle size and normal global function.    PERICARDIAL EFFUSION  There is no pericardial effusion.      MISCELLANEOUS  The following segments of the aorta are normal in size: sinuses of  Valsalva, sinotubular ridge, ascending aorta.  The inferior vena cava is normal.        Outside records reviewed from: care everywhere      ASSESSMENT and PLAN  Merritt Almeida is a 66 year old male scheduled for Left Platysma Myectomy on 6/3/21 by Dr. Trevino in treatment of spasmodic torticollis.  PAC referral for risk assessment and optimization for anesthesia:    Pre-operative considerations:  1.  Cardiac:  Functional status- METS 4. Does not exercise " purposefully but can climb a flight of stairs in home. He walks his dog occasionally and was just in [facility] with wife and did quite a bit of walking there.    Low risk surgery with 0.4% (RCRI #) risk of major adverse cardiac event.   --denies cardiac history.  Denies chest pain, SOB, orthopnea, peripheral edema.  --dyslipidemia, will continue Crestor and hold Omega 3 for a week  --EKG 3/21/21: SR, prolonged QT, 410/493.  EKG 4/16/21: NSR, QT wnl at 410/476.  Denies lightheadedness, syncope  --was evaluated for BERNARDO 7/2020, TTE with EF 60%, mild LV dilation, no valve abnormalities, normal RV size and function.     2.  Pulm:  Airway feasible.  PATTIE risk: Known PATTIE, uses CPAP nightly  --former smoker  --no h/o asthma, COPD    3.  GI:  Risk of PONV score = 1.  If > 2, anti-emetic intervention recommended.  --GERD, well controlled on Prilosec    4.  Neuro/Psych:  --h/o TBI 5/2016 after falling down concrete steps.   --multiple facial fractures including a right temporal bone fracture. He had intraparenchymal hemorrhage of the temporal lobe as well as subarachnoid hemorrhage in shear injury. 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  --some cognitive issues/personality changes that seem to be getting better since initiating physical therapy in Jan of this year (per wife).  --followed by neurology [facility], Dr. Hutson  --h/o depression, will continue clomipramine, klonopin, and Zyprexa as directed.    5.  Onc:  --h/o bladder cancer s/p resection of tumor 2010 without recurrence    VTE risk: 1.8%    Patient is optimized and is acceptable candidate for the proposed procedure.  No further diagnostic evaluation is needed.       Christina Muñoz PA-C  Preoperative Assessment Center  [facility]  Phone: [phone]  Fax: [phone]

## 2021-05-11 NOTE — ANESTHESIA PREPROCEDURE EVALUATION
Anesthesia Pre-Procedure Evaluation    Patient: Eran Almeida   MRN: [id number] : 1955        Preoperative Diagnosis: * No surgery found *   Procedure :      Past Medical History:   Diagnosis Date     Anxiety ~8 months     Cancer (H) bladder ~8 years    Tumor removed by Dr. Cabrera     Coronary artery disease     potential. On crestor     Depressive disorder ~10 years     Gastroesophageal reflux disease ~ 10 years    omeprezole     Head injury May 12, 2016    occurred May 12, 2016 during accdent     Hearing problem May 12, 2016    occurred 2016 during accicent     Mixed hyperlipidemia 1998    Hyperlipidemia     Reduced vision May 12, 2016    occurred during May 12, 2016 accident     Sensorineural hearing loss May 12 -    after accident     Sleep apnea       Past Surgical History:   Procedure Laterality Date     ZZC NONSPECIFIC PROCEDURE      stabismus/amblyopia      Allergies   Allergen Reactions     No Known Drug Allergies       Social History     Tobacco Use     Smoking status: Former Smoker     Packs/day: 0.50     Years: 2.00     Pack years: 1.00     Types: Cigarettes     Start date: 1993     Quit date: 10/7/1996     Years since quittin.6     Smokeless tobacco: Never Used     Tobacco comment: quit age 32   Substance Use Topics     Alcohol use: No     Alcohol/week: 5.0 standard drinks     Comment: socially      Wt Readings from Last 1 Encounters:   21 113.9 kg (251 lb)        Anesthesia Evaluation   Pt has had prior anesthetic.     No history of anesthetic complications       ROS/MED HX  ENT/Pulmonary:     (+) sleep apnea, uses CPAP, tobacco use, Past use,  (-) asthma and COPD   Neurologic: Comment: H/o TBI , fell down concrete steps  Clonic hemifacial spasm  Incomplete facial paralysis   (-) no seizures and no CVA   Cardiovascular:     (+) Dyslipidemia -----dysrhythmias, Long QT,  (-) hypertension   METS/Exercise Tolerance: 4 - Raking leaves, gardening    Hematologic:  -  neg hematologic  ROS  (-) history of blood clots and history of blood transfusion   Musculoskeletal: Comment: Spastic torticollis  blepharospasm      GI/Hepatic:     (+) GERD,  (-) liver disease   Renal/Genitourinary:  - neg Renal ROS     Endo:     (+) Obesity,  (-) Type I DM, Type II DM and thyroid disease   Psychiatric/Substance Use:     (+) psychiatric history anxiety and depression     Infectious Disease:  - neg infectious disease ROS     Malignancy:   (+) Malignancy, History of Other.Other CA bladder ~ 2010 status post Surgery.    Other:  - neg other ROS          Physical Exam    Airway        Mallampati: I   TM distance: > 3 FB   Neck ROM: full   Mouth opening: > 3 cm    Respiratory Devices and Support    CPAP:      Dental  no notable dental history         Cardiovascular   cardiovascular exam normal       Rhythm and rate: regular and normal     Pulmonary   pulmonary exam normal        breath sounds clear to auscultation           OUTSIDE LABS:  CBC:   Lab Results   Component Value Date    WBC 10.9 09/11/2020    WBC 9.1 07/29/2020    HGB 11.7 (L) 09/11/2020    HGB 11.3 (L) 07/29/2020    HCT 37.3 (L) 09/11/2020    HCT 35.6 (L) 07/29/2020     09/11/2020     07/29/2020     BMP:   Lab Results   Component Value Date     09/11/2020     07/29/2020    POTASSIUM 4.3 09/11/2020    POTASSIUM 3.7 07/29/2020    CHLORIDE 109 09/11/2020    CHLORIDE 111 (H) 07/29/2020    CO2 21 09/11/2020    CO2 23 07/29/2020    BUN 18 09/11/2020    BUN 16 07/29/2020    CR 1.17 09/11/2020    CR 1.18 07/29/2020    GLC 97 09/11/2020     (H) 07/29/2020     COAGS:   Lab Results   Component Value Date    PTT 31 07/29/2020    INR 1.14 07/29/2020     POC:   Lab Results   Component Value Date     (H) 07/29/2020     HEPATIC:   Lab Results   Component Value Date    ALBUMIN 3.6 09/11/2020    PROTTOTAL 7.2 09/11/2020    ALT 25 09/11/2020    AST 23 09/11/2020    ALKPHOS 74 09/11/2020    BILITOTAL 0.5 09/11/2020      OTHER:   Lab Results   Component Value Date    QUINTON 8.7 09/11/2020             PAC Discussion and Assessment    ASA Classification: 3  Case is suitable for: ASC  Anesthetic techniques and relevant risks discussed: MAC with GA as backup  Invasive monitoring and risk discussed: No    Possibility and Risk of blood transfusion discussed: No            PAC Resident/NP Anesthesia Assessment: Merritt Almeida is a 66 year old male scheduled for Left Platysma Myectomy on 6/3/21 by Dr. Trevino in treatment of spasmodic torticollis.  PAC referral for risk assessment and optimization for anesthesia:    Pre-operative considerations:  1.  Cardiac:  Functional status- METS 4. Does not exercise purposefully but can climb a flight of stairs in home. He walks his dog occasionally and was just in [facility] [place] with wife and did quite a bit of walking there.    Low risk surgery with 0.4% (RCRI #) risk of major adverse cardiac event.   --denies cardiac history.  Denies chest pain, SOB, orthopnea, peripheral edema.  --dyslipidemia, will continue Crestor and hold Omega 3 for a week  --EKG 3/21/21: SR, prolonged QT, 410/493.  EKG 4/16/21: NSR, QT wnl at 410/476.  Denies lightheadedness, syncope  --was evaluated for BERNARDO 7/2020, TTE with EF 60%, mild LV dilation, no valve abnormalities, normal RV size and function.     2.  Pulm:  Airway feasible.  PATTIE risk: Known PATTIE, uses CPAP nightly  --former smoker  --no h/o asthma, COPD    3.  GI:  Risk of PONV score = 1.  If > 2, anti-emetic intervention recommended.  --GERD, well controlled on Prilosec    4.  Neuro/Psych:  --h/o TBI 5/2016 after falling down concrete steps.   --multiple facial fractures including a right temporal bone fracture. He had intraparenchymal hemorrhage of the temporal lobe as well as subarachnoid hemorrhage in shear injury. 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  --some cognitive issues/personality changes that seem to be  getting better since initiating physical therapy in Jan of this year (per wife).  --followed by neurology [facility], Dr. Hutson  --h/o depression, will continue clomipramine, klonopin, and Zyprexa as directed.    5.  Onc:  --h/o bladder cancer s/p resection of tumor 2010 without recurrence    VTE risk: 1.8%    Patient is optimized and is acceptable candidate for the proposed procedure.  No further diagnostic evaluation is needed.         **For further details of assessment, testing, and physical exam please see H and P completed on same date.          Christina Muñoz PA-C, [facility]    Reviewed and Signed by PAC Mid-Level Provider/Resident  Mid-Level Provider/Resident: Christina Muñoz  Date: 5/11/21                                 Christina Muñoz PA-C

## 2021-05-11 NOTE — H&P
Pre-Operative H & P     CC:  Preoperative exam to assess for increased cardiopulmonary risk while undergoing surgery and anesthesia.    Date of Encounter: 5/11/2021  Primary Care Physician:  Vinicius Bray  Associated Diagnosis: spasmodic torticollis    HPI  Eran Almeida is a 66 year old male who presents for pre-operative H & P in preparation for left platysma myectomy with Dr. Trevino on 6/3/21 at [facility].     This is a 66-year-old male with past medical history significant for dyslipidemia, PATTIE on CPAP, traumatic brain injury suffered May 12, 2016 with subsequent facial nerve dysfunction and spastic torticollis, GERD, depression, anxiety, and bladder cancer status post tumor resection in 2010.    Patient has facial spasm as a result of his traumatic brain injury.  This causes significant morbidity such as tightness and discomfort.  He does have some involuntary movement around his right eye and his mouth.  He has undergone chemodenervation of the right eye, face and neck with botulism toxin a with Dr. Audrey Waller.    History is obtained from the patient, his wife, and the medical record.    Past Medical History  Past Medical History:   Diagnosis Date     Anxiety ~8 months     Cancer (H) bladder ~8 years    Tumor removed by Dr. Cabrera     Coronary artery disease     potential. On crestor     Depressive disorder ~10 years     Gastroesophageal reflux disease ~ 10 years    omeprezole     Head injury May 12, 2016    occurred May 12, 2016 during accdent     Hearing problem May 12, 2016    occurred May12, 2016 during accicent     Mixed hyperlipidemia 1998    Hyperlipidemia     Reduced vision May 12, 2016    occurred during May 12, 2016 accident     Sensorineural hearing loss May 12 -    after accident     Sleep apnea        Past Surgical History  Past Surgical History:   Procedure Laterality Date     COLONOSCOPY       EYE SURGERY      platinum weight, right eye 2016      Z NONSPECIFIC PROCEDURE  1962    stabismus/amblyopia       Hx of Blood transfusions/reactions: denies     Hx of abnormal bleeding or anti-platelet use: denies    Menstrual history: No LMP for male patient.:     Steroid use in the last year: denies    Personal or FH with difficulty with Anesthesia:  denies    Prior to Admission Medications  Current Outpatient Medications   Medication Sig Dispense Refill     clomiPRAMINE (ANAFRANIL) 75 MG capsule Take 200 mg by mouth At Bedtime        clonazePAM (KLONOPIN) 0.5 MG tablet Take 1 mg by mouth 2 times daily        DHA-EPA-VITAMIN E PO Take by mouth every morning        ibuprofen (ADVIL/MOTRIN) 200 MG tablet Take 200 mg by mouth every 4 hours as needed for mild pain       magnesium 250 MG tablet Take 1 tablet by mouth every evening       MULTIVITAMIN TABS   OR 1 po qd (Patient taking differently: 3 times daily )       OLANZapine (ZYPREXA) 5 MG tablet Take 5 mg by mouth 3 times daily        omega 3 1000 MG CAPS Take by mouth 2 times daily       omeprazole (PRILOSEC) 40 MG capsule every morning        rosuvastatin (CRESTOR) 40 MG tablet Take 40 mg by mouth At Bedtime        sildenafil (VIAGRA) 100 MG cap/tab as needed        Vitamin D, Cholecalciferol, 25 MCG (1000 UT) TABS Take by mouth every morning       amantadine (SYMMETREL) 100 MG capsule Take 100 mg by mouth        amphetamine-dextroamphetamine (ADDERALL XR) 20 MG per 24 hr capsule Take 20 mg by mouth daily        Atorvastatin Calcium (LIPITOR PO)        Lidocaine (LIDOCARE) 4 % Patch Place 1 patch onto the skin daily as needed for moderate pain (musculoskeletal pain) Remove patch after 12 hours. To prevent lidocaine toxicity, patient should be patch free for 12 hrs daily. (Patient taking differently: Place 1 patch onto the skin daily as needed for moderate pain (musculoskeletal pain) Remove patch after 12 hours. To prevent lidocaine toxicity, patient should be patch free for 12 hrs daily.) 15 patch 0     LIPITOR 20  MG OR TABS 1 tab PO QD (Once per day) (Patient taking differently: No sig reported)       VITAMIN E 400 IU OR CAPS  1 po qd (Patient taking differently: No sig reported)         Allergies  Allergies   Allergen Reactions     No Known Drug Allergies        Social History  Social History     Socioeconomic History     Marital status:      Spouse name: john lentz     Number of children: 5     Years of education: 18     Highest education level: Not on file   Occupational History     Not on file   Social Needs     Financial resource strain: Not on file     Food insecurity     Worry: Not on file     Inability: Not on file     Transportation needs     Medical: Not on file     Non-medical: Not on file   Tobacco Use     Smoking status: Former Smoker     Packs/day: 0.50     Years: 2.00     Pack years: 1.00     Types: Cigarettes     Start date: 1993     Quit date: 10/7/1996     Years since quittin.6     Smokeless tobacco: Never Used     Tobacco comment: quit age 32   Substance and Sexual Activity     Alcohol use: No     Alcohol/week: 5.0 standard drinks     Comment: socially     Drug use: No     Sexual activity: Yes     Partners: Female     Birth control/protection: Post-menopausal   Lifestyle     Physical activity     Days per week: Not on file     Minutes per session: Not on file     Stress: Not on file   Relationships     Social connections     Talks on phone: Not on file     Gets together: Not on file     Attends Zoroastrianism service: Not on file     Active member of club or organization: Not on file     Attends meetings of clubs or organizations: Not on file     Relationship status: Not on file     Intimate partner violence     Fear of current or ex partner: Not on file     Emotionally abused: Not on file     Physically abused: Not on file     Forced sexual activity: Not on file   Other Topics Concern      Service Not Asked     Blood Transfusions Not Asked     Caffeine Concern Yes     Comment: 1-2 cups qd  "    Occupational Exposure Not Asked     Hobby Hazards Not Asked     Sleep Concern Not Asked     Stress Concern Not Asked     Weight Concern Not Asked     Special Diet Not Asked     Back Care Not Asked     Exercise Yes     Comment: deshaun raquetbrambo     Bike Helmet Not Asked     Seat Belt Yes     Self-Exams Not Asked   Social History Narrative     Not on file       Family History  Family History   Problem Relation Age of Onset     Breast Cancer Maternal Grandmother      Cancer Brother         ceased     Heart Disease Father         ceased     Depression Father            Anesthesia Evaluation   Pt has had prior anesthetic.     No history of anesthetic complications       ROS/MED HX  ENT/Pulmonary:     (+) sleep apnea, uses CPAP, tobacco use, Past use,  (-) asthma and COPD   Neurologic: Comment: H/o TBI 2016, fell down concrete steps  Clonic hemifacial spasm  Incomplete facial paralysis   (-) no seizures and no CVA   Cardiovascular:     (+) Dyslipidemia -----dysrhythmias, Long QT,  (-) hypertension   METS/Exercise Tolerance: 4 - Raking leaves, gardening    Hematologic:  - neg hematologic  ROS  (-) history of blood clots and history of blood transfusion   Musculoskeletal: Comment: Spastic torticollis  blepharospasm      GI/Hepatic:     (+) GERD,  (-) liver disease   Renal/Genitourinary:  - neg Renal ROS     Endo:     (+) Obesity,  (-) Type I DM, Type II DM and thyroid disease   Psychiatric/Substance Use:     (+) psychiatric history anxiety and depression     Infectious Disease:  - neg infectious disease ROS     Malignancy:   (+) Malignancy, History of Other.Other CA bladder ~ 2010 status post Surgery.    Other:  - neg other ROS          The complete review of systems is negative other than noted in the HPI or here.   Temp: 98.3  F (36.8  C) Temp src: Oral BP: 119/83 Pulse: 92   Resp: 12 SpO2: 94 %         251 lbs 0 oz  5' 11\"[PT REOPORTED[   Body mass index is 35.01 kg/m .       Physical Exam  Constitutional: Awake, " alert, cooperative, no apparent distress, and appears stated age.  Eyes: Pupils equal, round and reactive to light, extra ocular muscles intact, sclera clear, conjunctiva normal.  HENT: Normocephalic, oral pharynx with moist mucus membranes, good dentition. No goiter appreciated.   Respiratory: Clear to auscultation bilaterally, no crackles or wheezing.  Cardiovascular: Regular rate and rhythm, normal S1 and S2, and no murmur noted.  Carotids +2, no bruits. No edema. Palpable pulses to radial  DP and PT arteries.   GI: not assessed  Lymph/Hematologic: No cervical lymphadenopathy and no supraclavicular lymphadenopathy.  Genitourinary:  deferred  Skin: Warm and dry.  No rashes at anticipated surgical site.   Musculoskeletal: Full ROM of neck. There is no redness, warmth, or swelling of the joints. Gross motor strength is normal.    Neurologic: Awake, alert, oriented to name, place and time. Cranial nerves II-XII are grossly intact.   Neuropsychiatric: Calm, cooperative. Normal affect.     PRIOR LABS/DIAGNOSTIC STUDIES:  All labs and imaging personally reviewed    Component      Latest Ref Rng & Units 2020   Sodium      133 - 144 mmol/L 138   Potassium      3.4 - 5.3 mmol/L 4.3   Chloride      94 - 109 mmol/L 109   Carbon Dioxide      20 - 32 mmol/L 21   Anion Gap      3 - 14 mmol/L 8   Glucose      70 - 99 mg/dL 97   Urea Nitrogen      7 - 30 mg/dL 18   Creatinine      0.66 - 1.25 mg/dL 1.17   GFR Estimate      >60 mL/min/1.73:m2 66   GFR Estimate If Black      >60 mL/min/1.73:m2 76   Calcium      8.5 - 10.1 mg/dL 8.7   Bilirubin Total      0.2 - 1.3 mg/dL 0.5   Albumin      3.4 - 5.0 g/dL 3.6   Protein Total      6.8 - 8.8 g/dL 7.2   Alkaline Phosphatase      40 - 150 U/L 74   ALT      0 - 70 U/L 25   AST      0 - 45 U/L 23       EKG: Personally reviewed but formal cardiology read pendin21  Sinus rhythm  Minimal voltage criteria for LVH, may be normal variant  Borderline ECG  When compared with ECG of  "23-MAR-2021 13:16,  No significant change was found    Cardiac echo: 7/21/20  CONCLUSIONS  Left ventricular ejection fraction is visually estimated at 60%.  Upper limits of normal to mild left ventricular dilation is present.  No significant valvular abnormalities were identified.  Normal right ventricle size and normal global function.  No previous study available for comparison.      FINDINGS    MITRAL VALVE  Normal mitral valve structure and function.  Trace (physiologic) mitral regurgitation.    AORTIC VALVE  Normal aortic valve structure and function.  The aortic valve is tricuspid.    TRICUSPID VALVE  Normal tricuspid valve structure, but trace tricuspid regurgitation.  Pulmonary artery pressures cannot be estimated due to absence of  adequate TR jet.    PULMONIC VALVE  Normal pulmonic valve structure and function.    LEFT ATRIUM  Left atrial volume index is 20.3 mL/m^2. (Normal <34mL/m^2).    LEFT VENTRICLE  Upper limits of normal to mild left ventricular dilation is present.  Normal left ventricular wall thickness.  Thickening of the anteroseptal septum [\"sigmoid septum\"] is present.  Left ventricular ejection fraction is visually estimated at 60%.  Normal left ventricular diastolic function.    RIGHT ATRIUM  Normal right atrium.    RIGHT VENTRICLE  Normal right ventricle size and normal global function.    PERICARDIAL EFFUSION  There is no pericardial effusion.      MISCELLANEOUS  The following segments of the aorta are normal in size: sinuses of  Valsalva, sinotubular ridge, ascending aorta.  The inferior vena cava is normal.        Outside records reviewed from: care everywhere      ASSESSMENT and PLAN  Merritt Almeida is a 66 year old male scheduled for Left Platysma Myectomy on 6/3/21 by Dr. Trevino in treatment of spasmodic torticollis.  PAC referral for risk assessment and optimization for anesthesia:    Pre-operative considerations:  1.  Cardiac:  Functional status- METS 4. Does not exercise " purposefully but can climb a flight of stairs in home. He walks his dog occasionally and was just in [facility] with wife and did quite a bit of walking there.    Low risk surgery with 0.4% (RCRI #) risk of major adverse cardiac event.   --denies cardiac history.  Denies chest pain, SOB, orthopnea, peripheral edema.  --dyslipidemia, will continue Crestor and hold Omega 3 for a week  --EKG 3/21/21: SR, prolonged QT, 410/493.  EKG 4/16/21: NSR, QT wnl at 410/476.  Denies lightheadedness, syncope  --was evaluated for BERNARDO 7/2020, TTE with EF 60%, mild LV dilation, no valve abnormalities, normal RV size and function.     2.  Pulm:  Airway feasible.  PATTIE risk: Known PATTIE, uses CPAP nightly  --former smoker  --no h/o asthma, COPD    3.  GI:  Risk of PONV score = 1.  If > 2, anti-emetic intervention recommended.  --GERD, well controlled on Prilosec    4.  Neuro/Psych:  --h/o TBI 5/2016 after falling down concrete steps.   --multiple facial fractures including a right temporal bone fracture. He had intraparenchymal hemorrhage of the temporal lobe as well as subarachnoid hemorrhage in shear injury. 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  --some cognitive issues/personality changes that seem to be getting better since initiating physical therapy in Jan of this year (per wife).  --followed by neurology [facility], Dr. Hutson  --h/o depression, will continue clomipramine, klonopin, and Zyprexa as directed.    5.  Onc:  --h/o bladder cancer s/p resection of tumor 2010 without recurrence    VTE risk: 1.8%    Patient is optimized and is acceptable candidate for the proposed procedure.  No further diagnostic evaluation is needed.       Christina Muñoz PA-C  Preoperative Assessment Center  [facility]  Phone: [phone]  Fax: [phone]

## 2021-05-11 NOTE — TELEPHONE ENCOUNTER
[facility] Call Center    Phone Message    May a detailed message be left on voicemail: yes     Reason for Call: Other: Pt's wife Pushpa Lopez is calling saying she needs the CPT codes for pt's upcoming surgery with Dr. Audrey Waller.      Please give her a call back    Action Taken: Message routed to:  [facility] ([facility]): Ent    Travel Screening: Not Applicable

## 2021-05-11 NOTE — PATIENT INSTRUCTIONS
Preparing for Your Surgery      Name:  Eran Almeida   MRN:  [id number]   :  1955   Today's Date:  2021         Arriving for surgery:  Surgery date:  2021  Arrival time:  11:30 am    Restrictions due to COVID 19:  One consistent visitor is allowed per patient  No ill visitors  All visitors must wear face mask     parking is available for anyone with mobility limitations or disabilities. (Monday- Friday 7 am- 5 pm)    Please come to:    [facility]  [address] [place] [id number]    Please check in on the 5th floor at the [facility]ulatory Surgery Center       What can I eat or drink?    -  You may eat and drink normally until 8 hours before surgery. (Until 5 am)  -  You may have clear liquids up to 4 hours before surgery. (Until 9 am)  Examples of clear liquids:  Water  Clear broth  Juices (apple, white grape, white cranberry  and cider) without pulp  Noncarbonated, powder based beverages  (lemonade and Darrell-Aid)  Sodas (Sprite, 7-Up, ginger ale and seltzer)  Coffee or tea (without milk or cream)  Gatorade    --No alcohol for at least 24 hours before surgery    Which medicines can I take?    Hold Aspirin for 7 days before surgery.     Hold Multivitamins for 7 days before surgery.  Hold Supplements for (Vitamin E, Omega 3) 7 days before surgery.    Hold Ibuprofen (Advil, Motrin) for 1 day before surgery--unless otherwise directed by surgeon.    Hold Naproxen (Aleve) for 4 days before surgery.    Hold sildenafil (viagra) for at least 24 hours before surgery    -  DO NOT take the following medications the day of surgery:  Adderall      -  PLEASE TAKE the following medications the day of surgery:  All other usual am prescription medications       How do I prepare myself?  - Please take 2 showers before surgery using Scrubcare or Hibiclens soap.    Use this soap only from the neck to your toes.     Leave the soap on your skin for one minute--then rinse  thoroughly.      You may use your own shampoo and conditioner; no other hair products.   - Please remove all jewelry and body piercings.  - No lotions, deodorants or fragrance.  - No makeup or fingernail polish.   - Bring your ID and insurance card.        - All patients are required to have a Covid-19 test within 4 days of surgery/procedure.      -Patients will be contacted by the [facility] scheduling team within 1 week of surgery to make an appointment.      - Patients may call the Scheduling team at [phone] if they have not been scheduled within 4 days of  surgery.      ALL PATIENTS ARE REQUIRED TO HAVE A RESPONSIBLE ADULT TO DRIVE AND BE IN ATTENDANCE WITH THEM FOR 24 HOURS FOLLOWING SURGERY              Questions or Concerns:    -For questions regarding the day of surgery please contact the Ambulatory Surgery Center at [phone].    -If you have health changes between today and your surgery please contact your surgeon.     For questions after surgery please call your surgeons office.

## 2021-05-12 NOTE — TELEPHONE ENCOUNTER
Left detailed message for Pushpa Lopez on personal vm. [place] [address] and team has checked with insurance company that no approval is necessary for outpatient procedure. Left direct number for any questions or concerns.       Shala Hernandez on 5/12/2021 at 4:45 PM

## 2021-05-14 ENCOUNTER — TELEPHONE (OUTPATIENT)
Dept: OTOLARYNGOLOGY | Facility: CLINIC | Age: 66
End: 2021-05-14

## 2021-05-14 NOTE — TELEPHONE ENCOUNTER
Called pt's wife re: questions she has about pt's upcoming surgery with Dr. Trevino.    Left my direct dial for call back.    Johanny Perales RN  5/14/2021 12:29 PM

## 2021-05-14 NOTE — TELEPHONE ENCOUNTER
Pushpa lentz patients wife called back with questions regarding upcoming surgery with Dr. Audrey Waller.   Per Pushpa Lentz, patient had a TBI and doesn't remember why he is having surgery or what for.    Informed Pushpa Juan basics of the surgery, but will have [facility] [place] call back to answer her detailed questions. Informed her that surgery recovery time is minimal. Okay for vacation 2 weeks after surgery. Incision is only a couple inches. Will come back 1 week after surgery for post op. Patient will have lifting restrictions following surgery > 10lbs. Some discomfort at the incision site to be expected following surgery.  Pushpa Lentz still has questions that she would like answered... Phone call was made to SHAWN [place] for follow up at Pushpa Lentz's request.   -What is the surgery for?   -What is to be expected after surgery? -What after cares will need to be done at home after surgery?   -Will patient have stitches?  -Procedure is on the left but paralysis is on the right? Why?     Yin states that Johanny can call her cell phone as she is a triage nurse today. Let Pushpa Lentz know that Johanny is in clinic today in [place] but will pass the message along. She will wait to be contacted from [facility] [place]

## 2021-05-17 NOTE — TELEPHONE ENCOUNTER
Spoke with pt's wife re: questions she had about Merritt's upcoming Platysma Myectomy with Dr. Trevino.    I spoke in great detail with Pushpa Lopez about pt's recovery time.  I let her know that the post-op pain is minimal, aside from some tenderness at the incision line and when moving the head side to side.  I reiterated that I have seen multiple pts tolerate this surgery very well.    Sutures will be removed one week post-op and pt will be unable to lift more than 10 lbs x 2 wks.    Pt's wife verbalized understanding of our discussion and denied having any further questions.    Johanny Perales RN  5/17/2021 4:39 PM

## 2021-05-18 DIAGNOSIS — Z11.59 ENCOUNTER FOR SCREENING FOR OTHER VIRAL DISEASES: ICD-10-CM

## 2021-05-30 ENCOUNTER — HOSPITAL ENCOUNTER (OUTPATIENT)
Dept: LAB | Facility: CLINIC | Age: 66
End: 2021-05-30
Attending: OTOLARYNGOLOGY
Payer: COMMERCIAL

## 2021-05-30 DIAGNOSIS — Z11.59 ENCOUNTER FOR SCREENING FOR OTHER VIRAL DISEASES: ICD-10-CM

## 2021-05-31 ENCOUNTER — HOSPITAL ENCOUNTER (OUTPATIENT)
Dept: LAB | Facility: CLINIC | Age: 66
Discharge: HOME | End: 2021-05-31
Admitting: OTOLARYNGOLOGY
Payer: COMMERCIAL

## 2021-05-31 DIAGNOSIS — Z11.59 ENCOUNTER FOR SCREENING FOR OTHER VIRAL DISEASES: ICD-10-CM

## 2021-05-31 LAB
LABORATORY COMMENT REPORT: NORMAL
SARS-COV-2 RNA RESP QL NAA+PROBE: NEGATIVE
SARS-COV-2 RNA RESP QL NAA+PROBE: NORMAL
SPECIMEN SOURCE: NORMAL
SPECIMEN SOURCE: NORMAL

## 2021-05-31 PROCEDURE — U0003 INFECTIOUS AGENT DETECTION BY NUCLEIC ACID (DNA OR RNA); SEVERE ACUTE RESPIRATORY SYNDROME CORONAVIRUS 2 (SARS-COV-2) (CORONAVIRUS DISEASE [COVID-19]), AMPLIFIED PROBE TECHNIQUE, MAKING USE OF HIGH THROUGHPUT TECHNOLOGIES AS DESCRIBED BY CMS-2020-01-R: HCPCS | Performed by: OTOLARYNGOLOGY

## 2021-05-31 PROCEDURE — U0005 INFEC AGEN DETEC AMPLI PROBE: HCPCS | Performed by: OTOLARYNGOLOGY

## 2021-06-02 ENCOUNTER — ANESTHESIA EVENT (OUTPATIENT)
Dept: SURGERY | Facility: AMBULATORY SURGERY CENTER | Age: 66
End: 2021-06-02
Payer: COMMERCIAL

## 2021-06-03 ENCOUNTER — HOSPITAL ENCOUNTER (OUTPATIENT)
Facility: AMBULATORY SURGERY CENTER | Age: 66
End: 2021-06-03
Attending: OTOLARYNGOLOGY
Payer: COMMERCIAL

## 2021-06-03 ENCOUNTER — ANESTHESIA (OUTPATIENT)
Dept: SURGERY | Facility: AMBULATORY SURGERY CENTER | Age: 66
End: 2021-06-03
Payer: COMMERCIAL

## 2021-06-03 VITALS
TEMPERATURE: 98 F | WEIGHT: 250 LBS | BODY MASS INDEX: 35 KG/M2 | HEART RATE: 83 BPM | OXYGEN SATURATION: 97 % | DIASTOLIC BLOOD PRESSURE: 80 MMHG | SYSTOLIC BLOOD PRESSURE: 129 MMHG | RESPIRATION RATE: 18 BRPM | HEIGHT: 71 IN

## 2021-06-03 DIAGNOSIS — G24.3 SPASMODIC TORTICOLLIS: ICD-10-CM

## 2021-06-03 LAB
CREAT SERPL-MCNC: 0.99 MG/DL (ref 0.66–1.25)
GFR SERPL CREATININE-BSD FRML MDRD: 80 ML/MIN/{1.73_M2}
POTASSIUM SERPL-SCNC: 4 MMOL/L (ref 3.4–5.3)

## 2021-06-03 PROCEDURE — 21899 UNLISTED PX NECK/THORAX: CPT

## 2021-06-03 RX ORDER — NALOXONE HYDROCHLORIDE 0.4 MG/ML
0.4 INJECTION, SOLUTION INTRAMUSCULAR; INTRAVENOUS; SUBCUTANEOUS
Status: DISCONTINUED | OUTPATIENT
Start: 2021-06-03 | End: 2021-06-04 | Stop reason: HOSPADM

## 2021-06-03 RX ORDER — DEXAMETHASONE SODIUM PHOSPHATE 4 MG/ML
INJECTION, SOLUTION INTRA-ARTICULAR; INTRALESIONAL; INTRAMUSCULAR; INTRAVENOUS; SOFT TISSUE PRN
Status: DISCONTINUED | OUTPATIENT
Start: 2021-06-03 | End: 2021-06-03

## 2021-06-03 RX ORDER — CEFAZOLIN SODIUM 2 G/50ML
2 SOLUTION INTRAVENOUS
Status: COMPLETED | OUTPATIENT
Start: 2021-06-03 | End: 2021-06-03

## 2021-06-03 RX ORDER — MEPERIDINE HYDROCHLORIDE 25 MG/ML
12.5 INJECTION INTRAMUSCULAR; INTRAVENOUS; SUBCUTANEOUS
Status: DISCONTINUED | OUTPATIENT
Start: 2021-06-03 | End: 2021-06-04 | Stop reason: HOSPADM

## 2021-06-03 RX ORDER — ONDANSETRON 2 MG/ML
4 INJECTION INTRAMUSCULAR; INTRAVENOUS EVERY 30 MIN PRN
Status: DISCONTINUED | OUTPATIENT
Start: 2021-06-03 | End: 2021-06-04 | Stop reason: HOSPADM

## 2021-06-03 RX ORDER — CEFAZOLIN SODIUM 2 G/50ML
2 SOLUTION INTRAVENOUS SEE ADMIN INSTRUCTIONS
Status: DISCONTINUED | OUTPATIENT
Start: 2021-06-03 | End: 2021-06-03 | Stop reason: HOSPADM

## 2021-06-03 RX ORDER — NALOXONE HYDROCHLORIDE 0.4 MG/ML
0.2 INJECTION, SOLUTION INTRAMUSCULAR; INTRAVENOUS; SUBCUTANEOUS
Status: DISCONTINUED | OUTPATIENT
Start: 2021-06-03 | End: 2021-06-04 | Stop reason: HOSPADM

## 2021-06-03 RX ORDER — SODIUM CHLORIDE, SODIUM LACTATE, POTASSIUM CHLORIDE, CALCIUM CHLORIDE 600; 310; 30; 20 MG/100ML; MG/100ML; MG/100ML; MG/100ML
INJECTION, SOLUTION INTRAVENOUS CONTINUOUS
Status: DISCONTINUED | OUTPATIENT
Start: 2021-06-03 | End: 2021-06-04 | Stop reason: HOSPADM

## 2021-06-03 RX ORDER — LIDOCAINE HYDROCHLORIDE AND EPINEPHRINE 10; 10 MG/ML; UG/ML
INJECTION, SOLUTION INFILTRATION; PERINEURAL PRN
Status: DISCONTINUED | OUTPATIENT
Start: 2021-06-03 | End: 2021-06-03 | Stop reason: HOSPADM

## 2021-06-03 RX ORDER — LIDOCAINE 40 MG/G
CREAM TOPICAL
Status: DISCONTINUED | OUTPATIENT
Start: 2021-06-03 | End: 2021-06-03 | Stop reason: HOSPADM

## 2021-06-03 RX ORDER — ONDANSETRON 4 MG/1
4 TABLET, ORALLY DISINTEGRATING ORAL EVERY 30 MIN PRN
Status: DISCONTINUED | OUTPATIENT
Start: 2021-06-03 | End: 2021-06-04 | Stop reason: HOSPADM

## 2021-06-03 RX ORDER — HYDROMORPHONE HYDROCHLORIDE 1 MG/ML
.3-.5 INJECTION, SOLUTION INTRAMUSCULAR; INTRAVENOUS; SUBCUTANEOUS EVERY 10 MIN PRN
Status: DISCONTINUED | OUTPATIENT
Start: 2021-06-03 | End: 2021-06-04 | Stop reason: HOSPADM

## 2021-06-03 RX ORDER — SODIUM CHLORIDE, SODIUM LACTATE, POTASSIUM CHLORIDE, CALCIUM CHLORIDE 600; 310; 30; 20 MG/100ML; MG/100ML; MG/100ML; MG/100ML
INJECTION, SOLUTION INTRAVENOUS CONTINUOUS
Status: DISCONTINUED | OUTPATIENT
Start: 2021-06-03 | End: 2021-06-03 | Stop reason: HOSPADM

## 2021-06-03 RX ORDER — TRAMADOL HYDROCHLORIDE 50 MG/1
50 TABLET ORAL EVERY 6 HOURS PRN
Qty: 8 TABLET | Refills: 0 | Status: SHIPPED | OUTPATIENT
Start: 2021-06-03

## 2021-06-03 RX ORDER — FENTANYL CITRATE 50 UG/ML
INJECTION, SOLUTION INTRAMUSCULAR; INTRAVENOUS PRN
Status: DISCONTINUED | OUTPATIENT
Start: 2021-06-03 | End: 2021-06-03

## 2021-06-03 RX ORDER — ACETAMINOPHEN 325 MG/1
650 TABLET ORAL
Status: DISCONTINUED | OUTPATIENT
Start: 2021-06-03 | End: 2021-06-04 | Stop reason: HOSPADM

## 2021-06-03 RX ORDER — FENTANYL CITRATE 50 UG/ML
25-50 INJECTION, SOLUTION INTRAMUSCULAR; INTRAVENOUS
Status: DISCONTINUED | OUTPATIENT
Start: 2021-06-03 | End: 2021-06-03 | Stop reason: HOSPADM

## 2021-06-03 RX ORDER — ONDANSETRON 2 MG/ML
INJECTION INTRAMUSCULAR; INTRAVENOUS PRN
Status: DISCONTINUED | OUTPATIENT
Start: 2021-06-03 | End: 2021-06-03

## 2021-06-03 RX ORDER — OXYCODONE HYDROCHLORIDE 5 MG/1
5 TABLET ORAL EVERY 4 HOURS PRN
Status: DISCONTINUED | OUTPATIENT
Start: 2021-06-03 | End: 2021-06-04 | Stop reason: HOSPADM

## 2021-06-03 RX ORDER — PROPOFOL 10 MG/ML
INJECTION, EMULSION INTRAVENOUS PRN
Status: DISCONTINUED | OUTPATIENT
Start: 2021-06-03 | End: 2021-06-03

## 2021-06-03 RX ADMIN — DEXAMETHASONE SODIUM PHOSPHATE 4 MG: 4 INJECTION, SOLUTION INTRA-ARTICULAR; INTRALESIONAL; INTRAMUSCULAR; INTRAVENOUS; SOFT TISSUE at 12:23

## 2021-06-03 RX ADMIN — PROPOFOL 30 MG: 10 INJECTION, EMULSION INTRAVENOUS at 12:08

## 2021-06-03 RX ADMIN — ONDANSETRON 4 MG: 2 INJECTION INTRAMUSCULAR; INTRAVENOUS at 12:00

## 2021-06-03 RX ADMIN — PROPOFOL 20 MG: 10 INJECTION, EMULSION INTRAVENOUS at 12:10

## 2021-06-03 RX ADMIN — SODIUM CHLORIDE, SODIUM LACTATE, POTASSIUM CHLORIDE, CALCIUM CHLORIDE: 600; 310; 30; 20 INJECTION, SOLUTION INTRAVENOUS at 11:40

## 2021-06-03 RX ADMIN — FENTANYL CITRATE 25 MCG: 50 INJECTION, SOLUTION INTRAMUSCULAR; INTRAVENOUS at 12:06

## 2021-06-03 RX ADMIN — CEFAZOLIN SODIUM 2 G: 2 SOLUTION INTRAVENOUS at 12:00

## 2021-06-03 ASSESSMENT — MIFFLIN-ST. JEOR: SCORE: 1946.12

## 2021-06-03 ASSESSMENT — LIFESTYLE VARIABLES: TOBACCO_USE: 1

## 2021-06-03 ASSESSMENT — COPD QUESTIONNAIRES: COPD: 0

## 2021-06-03 ASSESSMENT — ENCOUNTER SYMPTOMS
DYSRHYTHMIAS: 1
SEIZURES: 0

## 2021-06-03 NOTE — DISCHARGE INSTRUCTIONS
[facility]  Home Care Following Anesthesia  For 24 hours after surgery:  1. Get plenty of rest.  A responsible adult must stay with you for at least 24 hours after you leave the surgery center.  2. Do not drive or use heavy equipment.  If you have weakness or tingling, don't drive or use heavy equipment until this feeling goes away.   3. Do not drink alcohol.   4. Avoid strenuous or risky activities.  Ask for help when climbing stairs.  5. You may feel lightheaded.  IF so, sit for a few minutes before standing.  Have someone help you get up.   6. If you have nausea (feel sick to your stomach): Drink only clear liquids such as apple juice, ginger ale, broth or 7-Up.  Rest may also help.  Be sure to drink enough fluids.  Move to a regular diet as you feel able.   7. You may have a slight fever.  Call the doctor if your fever is over 100 F (37.7 C) (taken under the tongue) or lasts longer than 24 hours.  8. You may have a dry mouth, a sore throat, muscle aches or trouble sleeping. These should go away after 24 hours.  9. Do not make important or legal decisions.   10. It is recommended to avoid smoking.     Tips for taking pain medications  To get the best pain relief possible, remember these points:    Take pain medications as directed, before pain becomes severe.    Pain medication can upset your stomach: taking it with food may help.    Constipation is a common side effect of pain medication. Drink plenty of  fluids.    Eat foods high in fiber. Take a stool softener if recommended by your doctor or pharmacist.    Do not drink alcohol, drive or operate machinery while taking pain medications.    Ask about other ways to control pain, such as with heat, ice or relaxation.    Tylenol/Acetaminophen Consumption  To help encourage the safe use of acetaminophen, the makers of TYLENOL  have lowered the maximum daily dose for single-ingredient Extra Strength TYLENOL  (acetaminophen)  products sold in the [place] from 8 pills per day (4,000 mg) to 6 pills per day (3,000 mg). The dosing interval has also changed from 2 pills every 4-6 hours to 2 pills every 6 hours.    If you feel your pain relief is insufficient, you may take Tylenol/Acetaminophen in addition to your narcotic pain medication.     Be careful not to exceed 3,000 mg of Tylenol/Acetaminophen in a 24 hour period from all sources.    If you are taking extra strength Tylenol/acetaminophen (500 mg), the maximum dose is 6 tablets in 24 hours.    If you are taking regular strength acetaminophen (325 mg), the maximum dose is 9 tablets in 24 hours.    Call a doctor for any of the followin. Signs of infection (fever, growing tenderness at the surgery site, a large amount of drainage or bleeding, severe pain, foul-smelling drainage, redness, swelling).  2. It has been over 8 to 10 hours since surgery and you are still not able to urinate (pass water).  3. Headache for over 24 hours.  4. Signs of Covid-19 infection (temperature over 100 degrees, shortness of breath, cough, loss of taste/smell, generalized body aches, persistent headache, chills, sore throat, nausea/vomiting/diarrhea)  Your doctor is:  Dr. Kaci Trevino, Otolaryngology: [phone]  Or dial [phone] and ask for the resident on call for:  ENT Otolaryngology  For emergency care, call the:  [facility]:  [phone] (TTY for hearing impaired: [phone])

## 2021-06-03 NOTE — ANESTHESIA PREPROCEDURE EVALUATION
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 Cabrera     Coronary artery disease     potential. On crestor     Depressive disorder ~10 years     Gastroesophageal reflux disease ~ 10 years    omeprezole     Head injury May 12, 2016    occurred May 12, 2016 during accdent     Hearing problem May 12, 2016    occurred 2016 during accicent     Mixed hyperlipidemia     Hyperlipidemia     Reduced vision May 12, 2016    occurred during May 12, 2016 accident     Sensorineural hearing loss May 12 -    after accident     Sleep apnea       Past Surgical History:   Procedure Laterality Date     COLONOSCOPY       EYE SURGERY      platinum weight, right eye 2016     ZZC NONSPECIFIC PROCEDURE      stabismus/amblyopia      Allergies   Allergen Reactions     No Known Drug Allergies       Social History     Tobacco Use     Smoking status: Former Smoker     Packs/day: 0.50     Years: 2.00     Pack years: 1.00     Types: Cigarettes     Start date: 1993     Quit date: 10/7/1996     Years since quittin.6     Smokeless tobacco: Never Used     Tobacco comment: quit age 32   Substance Use Topics     Alcohol use: No     Alcohol/week: 5.0 standard drinks     Comment: socially      Wt Readings from Last 1 Encounters:   21 113.4 kg (250 lb)        Anesthesia Evaluation   Pt has had prior anesthetic.     No history of anesthetic complications       ROS/MED HX  ENT/Pulmonary:     (+) sleep apnea, uses CPAP, tobacco use, Past use,  (-) asthma and COPD   Neurologic: Comment: H/o TBI , fell down concrete steps  Clonic hemifacial spasm  Incomplete facial paralysis   (-) no seizures and no CVA   Cardiovascular:     (+) Dyslipidemia  -----dysrhythmias, Long QT,  (-) hypertension   METS/Exercise Tolerance: 4 - Raking leaves, gardening    Hematologic:  - neg hematologic  ROS  (-) history of blood clots and history of blood transfusion   Musculoskeletal: Comment: Spastic torticollis  blepharospasm      GI/Hepatic:     (+) GERD,  (-) liver disease   Renal/Genitourinary:  - neg Renal ROS     Endo:     (+) Obesity,  (-) Type I DM, Type II DM and thyroid disease   Psychiatric/Substance Use:     (+) psychiatric history anxiety and depression     Infectious Disease:  - neg infectious disease ROS     Malignancy:   (+) Malignancy, History of Other.Other CA bladder ~ 2010 status post Surgery.    Other:  - neg other ROS          Physical Exam    Airway        Mallampati: I   TM distance: > 3 FB   Neck ROM: full   Mouth opening: > 3 cm    Respiratory Devices and Support    CPAP:      Dental  no notable dental history         Cardiovascular   cardiovascular exam normal       Rhythm and rate: regular and normal     Pulmonary   pulmonary exam normal        breath sounds clear to auscultation           OUTSIDE LABS:  CBC:   Lab Results   Component Value Date    WBC 10.9 09/11/2020    WBC 9.1 07/29/2020    HGB 11.7 (L) 09/11/2020    HGB 11.3 (L) 07/29/2020    HCT 37.3 (L) 09/11/2020    HCT 35.6 (L) 07/29/2020     09/11/2020     07/29/2020     BMP:   Lab Results   Component Value Date     09/11/2020     07/29/2020    POTASSIUM 4.0 06/03/2021    POTASSIUM 4.3 09/11/2020    CHLORIDE 109 09/11/2020    CHLORIDE 111 (H) 07/29/2020    CO2 21 09/11/2020    CO2 23 07/29/2020    BUN 18 09/11/2020    BUN 16 07/29/2020    CR 0.99 06/03/2021    CR 1.17 09/11/2020    GLC 97 09/11/2020     (H) 07/29/2020     COAGS:   Lab Results   Component Value Date    PTT 31 07/29/2020    INR 1.14 07/29/2020     POC:   Lab Results   Component Value Date     (H) 07/29/2020     HEPATIC:   Lab Results   Component Value Date    ALBUMIN 3.6 09/11/2020     PROTTOTAL 7.2 09/11/2020    ALT 25 09/11/2020    AST 23 09/11/2020    ALKPHOS 74 09/11/2020    BILITOTAL 0.5 09/11/2020     OTHER:   Lab Results   Component Value Date    QUINTON 8.7 09/11/2020       Anesthesia Plan    ASA Status:  3   NPO Status:  NPO Appropriate    Anesthesia Type: MAC.     - Reason for MAC: straight local not clinically adequate   Induction: Intravenous, Propofol.   Maintenance: TIVA.        Consents    Anesthesia Plan(s) and associated risks, benefits, and realistic alternatives discussed. Questions answered and patient/representative(s) expressed understanding.     - Discussed with:  Patient      - Extended Intubation/Ventilatory Support Discussed: No.      - Patient is DNR/DNI Status: No    Use of blood products discussed: No .     Postoperative Care    Pain management: IV analgesics, Oral pain medications, Multi-modal analgesia.   PONV prophylaxis: Ondansetron (or other 5HT-3), Dexamethasone or Solumedrol     Comments:              PAC Discussion and Assessment    ASA Classification: 3  Case is suitable for: ASC  Anesthetic techniques and relevant risks discussed: MAC with GA as backup  Invasive monitoring and risk discussed: No    Possibility and Risk of blood transfusion discussed: No            PAC Resident/NP Anesthesia Assessment: Merritt Almeida is a 66 year old male scheduled for Left Platysma Myectomy on 6/3/21 by Dr. Trevino in treatment of spasmodic torticollis.  PAC referral for risk assessment and optimization for anesthesia:    Pre-operative considerations:  1.  Cardiac:  Functional status- METS 4. Does not exercise purposefully but can climb a flight of stairs in home. He walks his dog occasionally and was just in [place] with wife and did quite a bit of walking there.    Low risk surgery with 0.4% (RCRI #) risk of major adverse cardiac event.   --denies cardiac history.  Denies chest pain, SOB, orthopnea, peripheral edema.  --dyslipidemia, will continue Crestor and hold Omega  3 for a week  --EKG 3/21/21: SR, prolonged QT, 410/493.  EKG 4/16/21: NSR, QT wnl at 410/476.  Denies lightheadedness, syncope  --was evaluated for BERNARDO 7/2020, TTE with EF 60%, mild LV dilation, no valve abnormalities, normal RV size and function.     2.  Pulm:  Airway feasible.  PATTIE risk: Known PATTIE, uses CPAP nightly  --former smoker  --no h/o asthma, COPD    3.  GI:  Risk of PONV score = 1.  If > 2, anti-emetic intervention recommended.  --GERD, well controlled on Prilosec    4.  Neuro/Psych:  --h/o TBI 5/2016 after falling down concrete steps.   --multiple facial fractures including a right temporal bone fracture. He had intraparenchymal hemorrhage of the temporal lobe as well as subarachnoid hemorrhage in shear injury. 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  --some cognitive issues/personality changes that seem to be getting better since initiating physical therapy in Jan of this year (per wife).  --followed by neurology [facility], Dr. Hutson  --h/o depression, will continue clomipramine, klonopin, and Zyprexa as directed.    5.  Onc:  --h/o bladder cancer s/p resection of tumor 2010 without recurrence    VTE risk: 1.8%    Patient is optimized and is acceptable candidate for the proposed procedure.  No further diagnostic evaluation is needed.         **For further details of assessment, testing, and physical exam please see H and P completed on same date.          Christina Muñoz PA-C, [facility]    Reviewed and Signed by PAC Mid-Level Provider/Resident  Mid-Level Provider/Resident: Christina Muñoz  Date: 5/11/21                                 Corbin Cedeno MD

## 2021-06-03 NOTE — ANESTHESIA CARE TRANSFER NOTE
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    Anesthesia Type:   MAC     Note:    Oropharynx: oropharynx clear of all foreign objects and spontaneously breathing  Level of Consciousness: awake  Oxygen Supplementation: room air    Independent Airway: airway patency satisfactory and stable  Dentition: dentition unchanged  Vital Signs Stable: post-procedure vital signs reviewed and stable  Report to RN Given: handoff report given  Patient transferred to: Phase II  Comments: Uneventful transport - pt sitting upright  Report to RN  Exchanging well; color aliyah  Pt comfortable  Pt responds appropriately to command  IV patent  Lips/teeth/dentition as preop status  Questions answered  /82  HR 89  TAT 98.1  RR 16  Sat 96-98% room air      Handoff Report: Identifed the Patient, Identified the Reponsible Provider, Reviewed the pertinent medical history, Discussed the surgical course, Reviewed Intra-OP anesthesia mangement and issues during anesthesia, Set expectations for post-procedure period and Allowed opportunity for questions and acknowledgement of understanding      Vitals: (Last set prior to Anesthesia Care Transfer)  CRNA VITALS  6/3/2021 1249 - 6/3/2021 1327      6/3/2021             Pulse:  87    SpO2:  93 %        Electronically Signed By: JOSH COKER CRNA  Gill 3, 2021  1:27 PM

## 2021-06-03 NOTE — ANESTHESIA POSTPROCEDURE EVALUATION
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    Anesthesia Type:  MAC    Note:  Disposition: Outpatient   Postop Pain Control: Uneventful            Sign Out: Well controlled pain   PONV:    Neuro/Psych: Uneventful            Sign Out: Acceptable/Baseline neuro status   Airway/Respiratory: Uneventful            Sign Out: Acceptable/Baseline resp. status   CV/Hemodynamics: Uneventful            Sign Out: Acceptable CV status; No obvious hypovolemia; No obvious fluid overload   Other NRE:    DID A NON-ROUTINE EVENT OCCUR?            Last vitals:  Vitals:    06/03/21 1100 06/03/21 1325 06/03/21 1340   BP: 132/88 133/82 129/80   Pulse: 80 89 83   Resp: 20 16 18   Temp:  36.7  C (98.1  F) 36.7  C (98  F)   SpO2: 97% 99% 97%       Last vitals prior to Anesthesia Care Transfer:  CRNA VITALS  6/3/2021 1249 - 6/3/2021 1349      6/3/2021             Pulse:  87    SpO2:  93 %          Electronically Signed By: Corbin Cedeno MD  Gill 3, 2021  2:49 PM

## 2021-06-03 NOTE — BRIEF OP NOTE
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  Implants: * No implants in log *     Chapin Chen MD  Otolaryngology - Head and Neck Surgery, PGY-1

## 2021-06-03 NOTE — OP NOTE
SURGEON:  Kaci Trevino MD   ASSISTANT SURGEON: Chapin Chen MD      TITLE OF OPERATION:  Left platysma excision                     INDICATIONS:  left spasmodic torticollis from head injury with right facial paralysis with hemifacial spasm    PREOPERATIVE DIAGNOSES: Left spasmodic torticollis, closed head injury, facial paralysis        POSTOPERATIVE DIAGNOSES: Left spasmodic torticollis, closed head injury, facial paralysis          ANESTHESIA:  IV sedation with local anesthetic         IMPLANT DEVICES: 1/2 penrose        SPECIMENS:  None.       COMPLICATIONS:  None.       BLOOD LOSS: 10 mls           SURGEON'S NARRATIVE:       FINDINGS:  The patient had a significant hypercontracture of the left platysma muscle as a result of compensation from right facial paralysis and sequelae..         DESCRIPTION OF PROCEDURE:      The patient was brought back to the operating room by the Anesthesiology staff. They were laid supine on the operating room table. The head of the bed was then turned 90 degrees towards the surgical team.  Arms were tucked at the side with all pressure points appropriately padded.  A time-out procedure was performed with all members of the operating room staff in agreement of the site of surgery, the patient identification and surgery to be performed. IV sedation was administered.     Attention was then turned to the neck. A pre-existing crease in the neck was identified for the incision and marked. The extent of the platysma bands and tightening were marked in the preoperative area. The neck was injected with 1% lidocaine with 1:100,000 epinephrine. The incision was made with a 15 blade scalpel and a subcutaneous elevation was performed. This exposed the muscle bellow. The anterior and posterior edges of the muscle which was found to be hypertrophied were identified and a sub platysmal elevation was performed. A two finger breath cuff of muscle was then excised. Care was taken to preserve the  external jugular vein and the great auricular nerve. A 1/2 in penrose drain was then placed. The incision was closed with 4.0 monocryl deep and a horizontal mattress 5.0 nylon. A dressing was placed over the neck.      The patient tolerated the procedure well.  There were no complications. The patient was taken to recovery following commands and breathing spontaneously.       I was present and scrubbed for the entire procedure.           MELLISA MERCADO MD

## 2021-06-04 ENCOUNTER — TELEPHONE (OUTPATIENT)
Dept: OTOLARYNGOLOGY | Facility: CLINIC | Age: 66
End: 2021-06-04

## 2021-06-04 NOTE — TELEPHONE ENCOUNTER
[facility] Call Center    Phone Message    May a detailed message be left on voicemail: yes     Reason for Call: Other: Pt's wife requests call back - she has a couple post surgery questions 1) when can Pt shower? 2) when he showers does he need to cover his surgical area? Please call back to advise - Thanks      Action Taken: Message routed to:  [facility] ([facility] ENT    Travel Screening: Not Applicable

## 2021-06-07 ENCOUNTER — NURSE TRIAGE (OUTPATIENT)
Dept: NURSING | Facility: CLINIC | Age: 66
End: 2021-06-07

## 2021-06-07 NOTE — TELEPHONE ENCOUNTER
[facility]: Nurse Triage Note  SITUATION/BACKGROUND                                                      Eran Almeida is a 66 year old male who calls who's wife calls with concerns of some red bumps around his neck incision  Allergies:   Allergies   Allergen Reactions     No Known Drug Allergies        ASSESSMENT      66 year old male s/p Left platysma excision on 6/3/21.  His wife calls today because he noted some small red bumps around the distal portion of his incision opposite of his penrose drain. Denies redness,swelling, warmth to skin, incisional drainage or fever   He continues to have serosanguinous drainage from his MULU drain.  She is not cleaning the incision or applying any ointment   She has it covered loosely with a guaze netting.      Instructed her to keep an eye on it and call back if redness,swelling, warmth to skin, incisional drainage or fever occur   It may be he is starting to react to the monocryl deep sutures   Explained sometimes they will surface rather than absorb like they should.     Instructed her to send a picture through [facility] if she can so we can get a better idea of what is happening..    Will forward to Dr. Audrey Galaviz RN              RECOMMENDATION/PLAN                                                      RECOMMENDED DISPOSITION:  See above  Will comply with recommendation: Yes    If further questions/concerns or if symptoms do not improve, worsen or new symptoms develop, call your PCP or [phone] to talk with the Resident on call, as soon as possible.    Guideline used: post op problem  Telephone Triage Protocols for Nurses, Fifth Edition, Joi Blanco, SHAWN, RN

## 2021-06-07 NOTE — TELEPHONE ENCOUNTER
Returned call to pt's wife re: concern she has over the appearance of the neck incision.    I left a vm for wife requesting that she text me a photo of the incision.      Left my direct dial for text/call back.    Johanny Perales RN  6/7/2021 3:23 PM

## 2021-06-09 ENCOUNTER — OFFICE VISIT (OUTPATIENT)
Dept: OTOLARYNGOLOGY | Facility: CLINIC | Age: 66
End: 2021-06-09
Payer: COMMERCIAL

## 2021-06-09 DIAGNOSIS — G51.33 HEMIFACIAL SPASM AFFECTING BOTH SIDES OF FACE: ICD-10-CM

## 2021-06-09 DIAGNOSIS — G24.3 SPASMODIC TORTICOLLIS: Primary | ICD-10-CM

## 2021-06-09 DIAGNOSIS — G24.5 BLEPHAROSPASM: ICD-10-CM

## 2021-06-09 PROCEDURE — 64616 CHEMODENERV MUSC NECK DYSTON: CPT | Mod: 51 | Performed by: OTOLARYNGOLOGY

## 2021-06-09 PROCEDURE — 99207 PR NO CHARGE INJECTABLE MED/DRUG: CPT | Mod: 25 | Performed by: OTOLARYNGOLOGY

## 2021-06-09 PROCEDURE — 64612 DESTROY NERVE FACE MUSCLE: CPT | Mod: 50 | Performed by: OTOLARYNGOLOGY

## 2021-06-09 NOTE — LETTER
6/9/2021       RE: Eran Almeida  [address]  [place] [address]     Dear Colleague,    Thank you for referring your patient, Eran Almeida, to the [facility] [place] at [facility]. Please see a copy of my visit note below.    Facial Plastic and Reconstructive Surgery      Eran Almeida presents today for evaluation of facial spasm. The patient has had the morbidity of facial nerve dysfunction and has significant morbidity, tightness and discomfort. Involuntary movement is also present around the eye and mouth which leads to discomfort, tightness and fatigue.     PMH, PSH, meds and allergies are unchanged.    Assessment:  Clonic Hemifacial spasm  Spastic torticollis  Incomplete facial paralysis  Blepharospasm    Plan:   Chemodenervation of eye, face and neck today.  Continues to have benefits from treatment  We adapted treatment today based on symptoms and exam.      Procedure: Chemodenervation with Botulinum Toxin A  Indication: Hemifacial Spasm and Hypercontracture  Injector: Kaci Trevino MD      Informed consent was obtained.  The skin was cleaned with antimicrobial solution and a topical ice was placed.     The patient was asked to systematically engage the muscles in the area to be injected. The tuberculin needles were used for subdermal injection and hemostasis was obtained with light digital pressure when needed. The skin was cleaned.     A total of 30 units were injected.  Botulinum toxin A type: Botox    Please see procedure log.    The patient tolerated the procedure well and there were no complications. Post procedure care instructions were given to the patient.        Again, thank you for allowing me to participate in the care of your patient.      Sincerely,    Kaci Trevino MD

## 2021-06-09 NOTE — LETTER
June 9, 2021      Eran W Juan Pablo  [address]  [place] [address]        To Whom It May Concern:    Eran Almeida  was seen on 6/9/2021.  He is cleared to return to exercising on 6/17/21.    Sincerely,        Kaci Trevino MD

## 2021-06-09 NOTE — PROGRESS NOTES
Facial Plastic and Reconstructive Surgery      Eran Almeida presents today for evaluation of facial spasm. The patient has had the morbidity of facial nerve dysfunction and has significant morbidity, tightness and discomfort. Involuntary movement is also present around the eye and mouth which leads to discomfort, tightness and fatigue.     PMH, PSH, meds and allergies are unchanged.    Assessment:  Clonic Hemifacial spasm  Spastic torticollis  Incomplete facial paralysis  Blepharospasm    Plan:   Chemodenervation of eye, face and neck today.  Continues to have benefits from treatment  We adapted treatment today based on symptoms and exam.      Procedure: Chemodenervation with Botulinum Toxin A  Indication: Hemifacial Spasm and Hypercontracture  Injector: Kaci Trevino MD      Informed consent was obtained.  The skin was cleaned with antimicrobial solution and a topical ice was placed.     The patient was asked to systematically engage the muscles in the area to be injected. The tuberculin needles were used for subdermal injection and hemostasis was obtained with light digital pressure when needed. The skin was cleaned.     A total of 30 units were injected.  Botulinum toxin A type: Botox    Please see procedure log.    The patient tolerated the procedure well and there were no complications. Post procedure care instructions were given to the patient.

## 2021-06-14 NOTE — NURSING NOTE
Pt comes in POD #6 s/p Left Platysma Myectomy.    Penrose drain and sutures removed from L. Neck incision.  Incision is well approximated and healing nicely.    Follow up with Dr. Trevino in 3 mos.    Johanny Perales RN  6/14/2021 12:28 PM

## 2021-07-15 ENCOUNTER — DOCUMENTATION ONLY (OUTPATIENT)
Dept: OTHER | Facility: CLINIC | Age: 66
End: 2021-07-15

## 2021-09-10 ENCOUNTER — OFFICE VISIT (OUTPATIENT)
Dept: PLASTIC SURGERY | Facility: CLINIC | Age: 66
End: 2021-09-10
Payer: COMMERCIAL

## 2021-09-10 DIAGNOSIS — F33.41 RECURRENT MAJOR DEPRESSIVE DISORDER IN PARTIAL REMISSION (H): ICD-10-CM

## 2021-09-10 NOTE — LETTER
September 10, 2021  Re: Eran Almeida  1955    Dear Dr. Reyes,    Thank you so much for referring Eran Almeida to the [facility]. I had the pleasure of visiting with Eran today.     Attached you will find a copy of my note. Please feel free to reach out to me with any questions, [phone].     Facial Plastic and Reconstructive Surgery      Eran Almeida is healing well from his left plastysma myectomy. He has improved movement on the left with smile.    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 The patient has had the morbidity of facial nerve dysfunction and has significant morbidity, tightness and discomfort. Involuntary movement is also present around the eye and mouth which leads to discomfort, tightness and fatigue.     PMH, PSH, meds and allergies are unchanged.    Assessment:  Clonic Hemifacial spasm  Spastic torticollis  Incomplete facial paralysis  Blepharospasm    Plan:   Chemodenervation of eye, face and neck today.  Continues to have benefits from treatment  We adapted treatment today based on symptoms and exam.      Procedure: Chemodenervation with Botulinum Toxin A  Indication: Hemifacial Spasm and Hypercontracture  Injector: Kaci Trevino MD      Informed consent was obtained.  The skin was cleaned with antimicrobial solution and a topical ice was placed.     The patient was asked to systematically engage the muscles in the area to be injected. The tuberculin needles were used for subdermal injection and hemostasis was obtained with light digital pressure when needed. The skin was cleaned.     A total of 39 units were injected.  Botulinum toxin A type: Botox    Please see procedure log.    The patient tolerated the procedure well and there were no complications. Post procedure care instructions were given to the patient.      I spent a total of 10 minutes face-to-face with Eran Almeida during today's office visit.  Over 50%  of this time was spent counseling the patient and/or coordinating care regarding the sequelae of facial paralysis and the morbidity associated with facial nerve dysfunction. The time includes reviewing past injections records, discussing effects and adapting to needs for treatment. The injection time is not included in this time and was a separate 7 minutes.  See note for details.            Your trust in our practice and care is much appreciated.    Sincerely,  Kaci Trevino MD

## 2021-09-10 NOTE — LETTER
9/10/2021       RE: Eran Almeida  [address]  [facility] [address]     Dear Colleague,    Thank you for [facility] your patient, [facility]ran Almeida[facility]. Please see a copy of my visit note below.    Facial Plastic and Reconstructive Surgery      Eran Almeida is healing well from his left plastysma myectomy. He has improved movement on the left with smile.    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 The patient has had the morbidity of facial nerve dysfunction and has significant morbidity, tightness and discomfort. Involuntary movement is also present around the eye and mouth which leads to discomfort, tightness and fatigue.     PMH, PSH, meds and allergies are unchanged.    Assessment:  Clonic Hemifacial spasm  Spastic torticollis  Incomplete facial paralysis  Blepharospasm    Plan:   Chemodenervation of eye, face and neck today.  Continues to have benefits from treatment  We adapted treatment today based on symptoms and exam.      Procedure: Chemodenervation with Botulinum Toxin A  Indication: Hemifacial Spasm and Hypercontracture  Injector: Kaci Trevino MD      Informed consent was obtained.  The skin was cleaned with antimicrobial solution and a topical ice was placed.     The patient was asked to systematically engage the muscles in the area to be injected. The tuberculin needles were used for subdermal injection and hemostasis was obtained with light digital pressure when needed. The skin was cleaned.     A total of 39 units were injected.  Botulinum toxin A type: Botox    Please see procedure log.    The patient tolerated the procedure well and there were no complications. Post procedure care instructions were given to the patient.      I spent a total of 10 minutes face-to-face with Eran Almeida during today's office visit.  Over 50% of this time was spent  counseling the patient and/or coordinating care regarding the sequelae of facial paralysis and the morbidity associated with facial nerve dysfunction. The time includes reviewing past injections records, discussing effects and adapting to needs for treatment. The injection time is not included in this time and was a separate 7 minutes.  See note for details.          Again, thank you for allowing me to participate in the care of your patient.      Sincerely,    Kaci Trevino MD

## 2021-09-10 NOTE — LETTER
9/10/2021       RE: Eran Almeida  [address]  [place] [place] [address]     Dear Colleague,    Thank you [facility] your patient, [facility] Almeida[facility]. Please see a copy of my visit note below.    Facial Plastic and Reconstructive Surgery      Eran Almeida is healing well from his left plastysma myectomy. He has improved movement on the left with smile.    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 The patient has had the morbidity of facial nerve dysfunction and has significant morbidity, tightness and discomfort. Involuntary movement is also present around the eye and mouth which leads to discomfort, tightness and fatigue.     PMH, PSH, meds and allergies are unchanged.    Assessment:  Clonic Hemifacial spasm  Spastic torticollis  Incomplete facial paralysis  Blepharospasm    Plan:   Chemodenervation of eye, face and neck today.  Continues to have benefits from treatment  We adapted treatment today based on symptoms and exam.      Procedure: Chemodenervation with Botulinum Toxin A  Indication: Hemifacial Spasm and Hypercontracture  Injector: Kaci Trevino MD      Informed consent was obtained.  The skin was cleaned with antimicrobial solution and a topical ice was placed.     The patient was asked to systematically engage the muscles in the area to be injected. The tuberculin needles were used for subdermal injection and hemostasis was obtained with light digital pressure when needed. The skin was cleaned.     A total of 39 units were injected.  Botulinum toxin A type: Botox    Please see procedure log.    The patient tolerated the procedure well and there were no complications. Post procedure care instructions were given to the patient.      I spent a total of 10 minutes face-to-face with Eran Almeida during today's office visit.  Over 50% of this time was spent  counseling the patient and/or coordinating care regarding the sequelae of facial paralysis and the morbidity associated with facial nerve dysfunction. The time includes reviewing past injections records, discussing effects and adapting to needs for treatment. The injection time is not included in this time and was a separate 7 minutes.  See note for details.          Again, thank you for allowing me to participate in the care of your patient.      Sincerely,    Kaci Trevino MD

## 2021-09-10 NOTE — PROGRESS NOTES
Facial Plastic and Reconstructive Surgery      Eran Almeida is healing well from his left plastysma myectomy. He has improved movement on the left with smile.    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 The patient has had the morbidity of facial nerve dysfunction and has significant morbidity, tightness and discomfort. Involuntary movement is also present around the eye and mouth which leads to discomfort, tightness and fatigue.     PMH, PSH, meds and allergies are unchanged.    Assessment:  Clonic Hemifacial spasm  Spastic torticollis  Incomplete facial paralysis  Blepharospasm    Plan:   Chemodenervation of eye, face and neck today.  Continues to have benefits from treatment  We adapted treatment today based on symptoms and exam.      Procedure: Chemodenervation with Botulinum Toxin A  Indication: Hemifacial Spasm and Hypercontracture  Injector: Kaci Trevino MD      Informed consent was obtained.  The skin was cleaned with antimicrobial solution and a topical ice was placed.     The patient was asked to systematically engage the muscles in the area to be injected. The tuberculin needles were used for subdermal injection and hemostasis was obtained with light digital pressure when needed. The skin was cleaned.     A total of 39 units were injected.  Botulinum toxin A type: Botox    Please see procedure log.    The patient tolerated the procedure well and there were no complications. Post procedure care instructions were given to the patient.      I spent a total of 10 minutes face-to-face with Eran Almeida during today's office visit.  Over 50% of this time was spent counseling the patient and/or coordinating care regarding the sequelae of facial paralysis and the morbidity associated with facial nerve dysfunction. The time includes reviewing past injections records, discussing effects and adapting to needs for treatment. The injection time is not included in  this time and was a separate 7 minutes.  See note for details.

## 2021-09-19 ENCOUNTER — HEALTH MAINTENANCE LETTER (OUTPATIENT)
Age: 66
End: 2021-09-19

## 2021-12-10 ENCOUNTER — OFFICE VISIT (OUTPATIENT)
Dept: PLASTIC SURGERY | Facility: CLINIC | Age: 66
End: 2021-12-10
Payer: COMMERCIAL

## 2021-12-10 DIAGNOSIS — G51.33 HEMIFACIAL SPASM AFFECTING BOTH SIDES OF FACE: Primary | ICD-10-CM

## 2021-12-10 DIAGNOSIS — G24.3 SPASMODIC TORTICOLLIS: ICD-10-CM

## 2021-12-10 NOTE — LETTER
12/10/2021      RE: Eran Almeida  [address] [place] [address]       Facial Plastic and Reconstructive Surgery      Eran Almeida presents today for evaluation of facial spasm. The patient has had the morbidity of facial nerve dysfunction and has significant morbidity, tightness and discomfort. Involuntary movement is also present around the eye and mouth which leads to discomfort, tightness and fatigue.     PMH, PSH, meds and allergies are unchanged.    Assessment:  Clonic Hemifacial spasm  Spastic torticollis  Incomplete facial paralysis  Blepharospasm    Plan:   Chemodenervation of eye, face and neck today.  Continues to have benefits from treatment  We adapted treatment today based on symptoms and exam.      Procedure: Chemodenervation with Botulinum Toxin A  Indication: Hemifacial Spasm and Hypercontracture  Injector: Kaci Trevino MD      Informed consent was obtained.  The skin was cleaned with antimicrobial solution and a topical ice was placed.     The patient was asked to systematically engage the muscles in the area to be injected. The tuberculin needles were used for subdermal injection and hemostasis was obtained with light digital pressure when needed. The skin was cleaned.     A total of 45 units were injected.  Botulinum toxin A type: Botox    Please see procedure log.    The patient tolerated the procedure well and there were no complications. Post procedure care instructions were given to the patient.      I spent a total of 10 minutes face-to-face with Eran W Juan Pablo during today's office visit.  Over 50% of this time was spent counseling the patient and/or coordinating care regarding the sequelae of facial paralysis and the morbidity associated with facial nerve dysfunction. The time includes reviewing past injections records, discussing effects and adapting to needs for treatment. The injection time is not included in this time and was a separate 7 minutes.  See  note for details.          Kaci Trevino MD

## 2021-12-10 NOTE — PROGRESS NOTES
Facial Plastic and Reconstructive Surgery      Eran Almeida presents today for evaluation of facial spasm. The patient has had the morbidity of facial nerve dysfunction and has significant morbidity, tightness and discomfort. Involuntary movement is also present around the eye and mouth which leads to discomfort, tightness and fatigue.     PMH, PSH, meds and allergies are unchanged.    Assessment:  Clonic Hemifacial spasm  Spastic torticollis  Incomplete facial paralysis  Blepharospasm    Plan:   Chemodenervation of eye, face and neck today.  Continues to have benefits from treatment  We adapted treatment today based on symptoms and exam.      Procedure: Chemodenervation with Botulinum Toxin A  Indication: Hemifacial Spasm and Hypercontracture  Injector: Kaci Trevino MD      Informed consent was obtained.  The skin was cleaned with antimicrobial solution and a topical ice was placed.     The patient was asked to systematically engage the muscles in the area to be injected. The tuberculin needles were used for subdermal injection and hemostasis was obtained with light digital pressure when needed. The skin was cleaned.     A total of 45 units were injected.  Botulinum toxin A type: Botox    Please see procedure log.    The patient tolerated the procedure well and there were no complications. Post procedure care instructions were given to the patient.      I spent a total of 10 minutes face-to-face with Eran Almeida during today's office visit.  Over 50% of this time was spent counseling the patient and/or coordinating care regarding the sequelae of facial paralysis and the morbidity associated with facial nerve dysfunction. The time includes reviewing past injections records, discussing effects and adapting to needs for treatment. The injection time is not included in this time and was a separate 7 minutes.  See note for details.

## 2021-12-10 NOTE — LETTER
December 10, 2021  Re: Eran Almeida  1955    Dear Dr. Reyes,    Thank you so much for referring Eran Almeida to the [facility]. I had the pleasure of visiting with Eran today.     Attached you will find a copy of my note. Please feel free to reach out to me with any questions, [phone].     Facial Plastic and Reconstructive Surgery      Eran Almeida presents today for evaluation of facial spasm. The patient has had the morbidity of facial nerve dysfunction and has significant morbidity, tightness and discomfort. Involuntary movement is also present around the eye and mouth which leads to discomfort, tightness and fatigue.     PMH, PSH, meds and allergies are unchanged.    Assessment:  Clonic Hemifacial spasm  Spastic torticollis  Incomplete facial paralysis  Blepharospasm    Plan:   Chemodenervation of eye, face and neck today.  Continues to have benefits from treatment  We adapted treatment today based on symptoms and exam.      Procedure: Chemodenervation with Botulinum Toxin A  Indication: Hemifacial Spasm and Hypercontracture  Injector: Kaci Trevino MD      Informed consent was obtained.  The skin was cleaned with antimicrobial solution and a topical ice was placed.     The patient was asked to systematically engage the muscles in the area to be injected. The tuberculin needles were used for subdermal injection and hemostasis was obtained with light digital pressure when needed. The skin was cleaned.     A total of 45 units were injected.  Botulinum toxin A type: Botox    Please see procedure log.    The patient tolerated the procedure well and there were no complications. Post procedure care instructions were given to the patient.      I spent a total of 10 minutes face-to-face with Eran Almeida during today's office visit.  Over 50% of this time was spent counseling the patient and/or coordinating care regarding the sequelae of facial paralysis and the morbidity  associated with facial nerve dysfunction. The time includes reviewing past injections records, discussing effects and adapting to needs for treatment. The injection time is not included in this time and was a separate 7 minutes.  See note for details.            Your trust in our practice and care is much appreciated.    Sincerely,  Kaci Trevino MD

## 2021-12-10 NOTE — LETTER
12/10/2021       RE: Eran Almeida  [address]  [facility] [address]     Dear Colleague,    Thank you for referring your patient, Eran Almeida, [facility]. Please see a copy of my visit note below.    Facial Plastic and Reconstructive Surgery      Eran Almeida presents today for evaluation of facial spasm. The patient has had the morbidity of facial nerve dysfunction and has significant morbidity, tightness and discomfort. Involuntary movement is also present around the eye and mouth which leads to discomfort, tightness and fatigue.     PMH, PSH, meds and allergies are unchanged.    Assessment:  Clonic Hemifacial spasm  Spastic torticollis  Incomplete facial paralysis  Blepharospasm    Plan:   Chemodenervation of eye, face and neck today.  Continues to have benefits from treatment  We adapted treatment today based on symptoms and exam.      Procedure: Chemodenervation with Botulinum Toxin A  Indication: Hemifacial Spasm and Hypercontracture  Injector: Kaci Trevino MD      Informed consent was obtained.  The skin was cleaned with antimicrobial solution and a topical ice was placed.     The patient was asked to systematically engage the muscles in the area to be injected. The tuberculin needles were used for subdermal injection and hemostasis was obtained with light digital pressure when needed. The skin was cleaned.     A total of 45 units were injected.  Botulinum toxin A type: Botox    Please see procedure log.    The patient tolerated the procedure well and there were no complications. Post procedure care instructions were given to the patient.      I spent a total of 10 minutes face-to-face with Eran Almeida during today's office visit.  Over 50% of this time was spent counseling the patient and/or coordinating care regarding the sequelae of facial paralysis and the morbidity associated with facial nerve  dysfunction. The time includes reviewing past injections records, discussing effects and adapting to needs for treatment. The injection time is not included in this time and was a separate 7 minutes.  See note for details.          Again, thank you for allowing me to participate in the care of your patient.      Sincerely,    Kaci Trevino MD

## 2022-01-09 ENCOUNTER — HEALTH MAINTENANCE LETTER (OUTPATIENT)
Age: 67
End: 2022-01-09

## 2022-02-17 PROBLEM — G31.09: Status: ACTIVE | Noted: 2019-09-08

## 2022-03-06 ENCOUNTER — HEALTH MAINTENANCE LETTER (OUTPATIENT)
Age: 67
End: 2022-03-06

## 2022-03-11 ENCOUNTER — OFFICE VISIT (OUTPATIENT)
Dept: PLASTIC SURGERY | Facility: CLINIC | Age: 67
End: 2022-03-11
Payer: COMMERCIAL

## 2022-03-11 DIAGNOSIS — G24.3 SPASMODIC TORTICOLLIS: Primary | ICD-10-CM

## 2022-03-11 DIAGNOSIS — G24.5 BLEPHAROSPASM OF RIGHT EYE: ICD-10-CM

## 2022-03-11 NOTE — LETTER
3/11/2022       RE: Eran Almeida  [address]  [facility] [address]     Dear Colleague,    Thank you for referring your patient, Eran Almeida, to the [facility]. Please see a copy of my visit note below.    Facial Plastic and Reconstructive Surgery      Eran Almeida presents today for evaluation of facial spasm. The patient has had the morbidity of facial nerve dysfunction and has significant morbidity, tightness and discomfort. Involuntary movement is also present around the eye and mouth which leads to discomfort, tightness and fatigue.     PMH, PSH, meds and allergies are unchanged.    Assessment:  Clonic Hemifacial spasm  Spastic torticollis  Incomplete facial paralysis  Blepharospasm    Plan:   Chemodenervation of eye, face and neck today.  Continues to have benefits from treatment  We adapted treatment today based on symptoms and exam.      Procedure: Chemodenervation with Botulinum Toxin A  Indication: Hemifacial Spasm and Hypercontracture  Injector: Kaci Trevino MD      Informed consent was obtained.  The skin was cleaned with antimicrobial solution and a topical ice was placed.     The patient was asked to systematically engage the muscles in the area to be injected. The tuberculin needles were used for subdermal injection and hemostasis was obtained with light digital pressure when needed. The skin was cleaned.     A total of 45 units were injected.  Botulinum toxin A type: Botox    Please see procedure log.    The patient tolerated the procedure well and there were no complications. Post procedure care instructions were given to the patient.      I spent a total of 10 minutes face-to-face with Eran Almeida during today's office visit.  Over 50% of this time was spent counseling the patient and/or coordinating care regarding the sequelae of facial paralysis and the morbidity associated with facial nerve  dysfunction. The time includes reviewing past injections records, discussing effects and adapting to needs for treatment. The injection time is not included in this time and was a separate 7 minutes.  See note for details.        Kaci Trevino MD

## 2022-03-11 NOTE — LETTER
March 11, 2022  Re: Eran Almeida  1955      Thank you so much for referring Eran Almeida to the [facility]. I had the pleasure of visiting with Eran today.     Attached you will find a copy of my note. Please feel free to reach out to me with any questions, [phone].     Facial Plastic and Reconstructive Surgery      Eran Almeida presents today for evaluation of facial spasm. The patient has had the morbidity of facial nerve dysfunction and has significant morbidity, tightness and discomfort. Involuntary movement is also present around the eye and mouth which leads to discomfort, tightness and fatigue.     PMH, PSH, meds and allergies are unchanged.    Assessment:  Clonic Hemifacial spasm  Spastic torticollis  Incomplete facial paralysis  Blepharospasm    Plan:   Chemodenervation of eye, face and neck today.  Continues to have benefits from treatment  We adapted treatment today based on symptoms and exam.      Procedure: Chemodenervation with Botulinum Toxin A  Indication: Hemifacial Spasm and Hypercontracture  Injector: Kaci Trevino MD      Informed consent was obtained.  The skin was cleaned with antimicrobial solution and a topical ice was placed.     The patient was asked to systematically engage the muscles in the area to be injected. The tuberculin needles were used for subdermal injection and hemostasis was obtained with light digital pressure when needed. The skin was cleaned.     A total of 45 units were injected.  Botulinum toxin A type: Botox    Please see procedure log.    The patient tolerated the procedure well and there were no complications. Post procedure care instructions were given to the patient.      I spent a total of 10 minutes face-to-face with Eran Almeida during today's office visit.  Over 50% of this time was spent counseling the patient and/or coordinating care regarding the sequelae of facial paralysis and the morbidity associated with facial  nerve dysfunction. The time includes reviewing past injections records, discussing effects and adapting to needs for treatment. The injection time is not included in this time and was a separate 7 minutes.  See note for details.      Kaci Trevino MD

## 2022-06-10 ENCOUNTER — OFFICE VISIT (OUTPATIENT)
Dept: PLASTIC SURGERY | Facility: CLINIC | Age: 67
End: 2022-06-10
Payer: COMMERCIAL

## 2022-06-10 DIAGNOSIS — G24.3 SPASMODIC TORTICOLLIS: Primary | ICD-10-CM

## 2022-06-10 DIAGNOSIS — G51.33 HEMIFACIAL SPASM AFFECTING BOTH SIDES OF FACE: ICD-10-CM

## 2022-06-10 DIAGNOSIS — G24.5 BLEPHAROSPASM: ICD-10-CM

## 2022-06-10 DIAGNOSIS — G51.9 SEVENTH CRANIAL NERVE DISEASE OR SYNDROME: ICD-10-CM

## 2022-06-10 NOTE — LETTER
Gill 10, 2022  Re: Eran Almeida  1955        Thank you so much for referring Eran Almeida to the [facility]. I had the pleasure of visiting with Eran today.     Attached you will find a copy of my note. Please feel free to reach out to me with any questions, [phone].     Facial Plastic and Reconstructive Surgery      Eran Almeida presents today for evaluation of facial spasm. The patient has had the morbidity of facial nerve dysfunction and has significant morbidity, tightness and discomfort. Involuntary movement is also present around the eye and mouth which leads to discomfort, tightness and fatigue. We have discussed the hemifacial spasm and discomfort and chemodenervation as therapy. Current symptoms are a sequelae of the facial nerve injury. There are  involuntary and unwanted movements of the face that hinder good and functional intended movements.     PMH, PSH, meds and allergies are unchanged.    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     Assessment:  Bilateral Clonic Hemifacial spasm  Spastic torticollis  Incomplete facial paralysis  Blepharospasm Right     Plan:   Chemodenervation of eye, face and neck today.  Continues to have benefits from treatment  We adapted treatment today based on symptoms and exam.      Procedure: Chemodenervation with Botulinum Toxin A  Indication: Hemifacial Spasm and Hypercontracture  Injector: Kaci Trevino MD      Informed consent was obtained.  The skin was cleaned with antimicrobial solution and a topical ice was placed.     The patient was asked to systematically engage the muscles in the area to be injected. The tuberculin needles were used for subdermal injection and hemostasis was obtained with light digital pressure when needed. The skin was cleaned.     A total of 45 units were injected.  Botulinum toxin A type: Botox    Please see procedure log.    The patient tolerated the procedure well and there were no  complications. Post procedure care instructions were given to the patient.      I spent a total of 10 minutes face-to-face with Eran Almeida during today's office visit.  Over 50% of this time was spent counseling the patient and/or coordinating care regarding the sequelae of facial paralysis and the morbidity associated with facial nerve dysfunction. The time includes reviewing past injections records, discussing effects and adapting to needs for treatment. The injection time is not included in this time and was a separate 7 minutes.  See note for details.          Sincerely,  Kaci Trevino MD

## 2022-06-10 NOTE — LETTER
6/10/2022       RE: Eran Almeida  [address]  [facility] [address]     Dear Colleague,    Thank you for referring your patient, Eran Almeida, to the [facility]. Please see a copy of my visit note below.    Facial Plastic and Reconstructive Surgery      Eran Almeida presents today for evaluation of facial spasm. The patient has had the morbidity of facial nerve dysfunction and has significant morbidity, tightness and discomfort. Involuntary movement is also present around the eye and mouth which leads to discomfort, tightness and fatigue. We have discussed the hemifacial spasm and discomfort and chemodenervation as therapy. Current symptoms are a sequelae of the facial nerve injury. There are  involuntary and unwanted movements of the face that hinder good and functional intended movements.     PMH, PSH, meds and allergies are unchanged.    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     Assessment:  Bilateral Clonic Hemifacial spasm  Spastic torticollis  Incomplete facial paralysis  Blepharospasm Right     Plan:   Chemodenervation of eye, face and neck today.  Continues to have benefits from treatment  We adapted treatment today based on symptoms and exam.    Procedure: Chemodenervation with Botulinum Toxin A  Indication: Hemifacial Spasm and Hypercontracture  Injector: Kaci Trevino MD      Informed consent was obtained.  The skin was cleaned with antimicrobial solution and a topical ice was placed.     The patient was asked to systematically engage the muscles in the area to be injected. The tuberculin needles were used for subdermal injection and hemostasis was obtained with light digital pressure when needed. The skin was cleaned.     A total of 45 units were injected.  Botulinum toxin A type: Botox    Please see procedure log.    The patient tolerated the procedure well and there were no  complications. Post procedure care instructions were given to the patient.      I spent a total of 10 minutes face-to-face with Eran Almeida during today's office visit.  Over 50% of this time was spent counseling the patient and/or coordinating care regarding the sequelae of facial paralysis and the morbidity associated with facial nerve dysfunction. The time includes reviewing past injections records, discussing effects and adapting to needs for treatment. The injection time is not included in this time and was a separate 7 minutes.  See note for details.      Sincerely,    Kaci Trevino MD

## 2022-06-10 NOTE — PROGRESS NOTES
Facial Plastic and Reconstructive Surgery      Eran Almeida presents today for evaluation of facial spasm. The patient has had the morbidity of facial nerve dysfunction and has significant morbidity, tightness and discomfort. Involuntary movement is also present around the eye and mouth which leads to discomfort, tightness and fatigue. We have discussed the hemifacial spasm and discomfort and chemodenervation as therapy. Current symptoms are a sequelae of the facial nerve injury. There are  involuntary and unwanted movements of the face that hinder good and functional intended movements.     PMH, PSH, meds and allergies are unchanged.    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     Assessment:  Bilateral Clonic Hemifacial spasm  Spastic torticollis  Incomplete facial paralysis  Blepharospasm Right     Plan:   Chemodenervation of eye, face and neck today.  Continues to have benefits from treatment  We adapted treatment today based on symptoms and exam.      Procedure: Chemodenervation with Botulinum Toxin A  Indication: Hemifacial Spasm and Hypercontracture  Injector: Kaci Trevino MD      Informed consent was obtained.  The skin was cleaned with antimicrobial solution and a topical ice was placed.     The patient was asked to systematically engage the muscles in the area to be injected. The tuberculin needles were used for subdermal injection and hemostasis was obtained with light digital pressure when needed. The skin was cleaned.     A total of 45 units were injected.  Botulinum toxin A type: Botox    Please see procedure log.    The patient tolerated the procedure well and there were no complications. Post procedure care instructions were given to the patient.      I spent a total of 10 minutes face-to-face with Eran Almeida during today's office visit.  Over 50% of this time was spent counseling the patient and/or coordinating care regarding the sequelae of facial paralysis  and the morbidity associated with facial nerve dysfunction. The time includes reviewing past injections records, discussing effects and adapting to needs for treatment. The injection time is not included in this time and was a separate 7 minutes.  See note for details.

## 2022-09-09 ENCOUNTER — OFFICE VISIT (OUTPATIENT)
Dept: PLASTIC SURGERY | Facility: CLINIC | Age: 67
End: 2022-09-09
Payer: COMMERCIAL

## 2022-09-09 DIAGNOSIS — G51.33 HEMIFACIAL SPASM AFFECTING BOTH SIDES OF FACE: Primary | ICD-10-CM

## 2022-09-09 NOTE — LETTER
9/9/2022       RE: Eran Almeida  [address]  [facility] [address]     Dear Colleague,    Thank you for referring your patient, Eran Almeida, [facility]. Please see a copy of my visit note below.    Facial Plastic and Reconstructive Surgery      Eran Almeida presents today for evaluation of facial spasm. The patient has had the morbidity of facial nerve dysfunction and has significant morbidity, tightness and discomfort. Involuntary movement is also present around the eye and mouth which leads to discomfort, tightness and fatigue. We have discussed the hemifacial spasm and discomfort and chemodenervation as therapy. Current symptoms are a sequelae of the facial nerve injury. There are  involuntary and unwanted movements of the face that hinder good and functional intended movements.     PMH, PSH, meds and allergies are unchanged.    Assessment:  Clonic Hemifacial spasm  Spastic torticollis  Incomplete facial paralysis  Blepharospasm    Plan:   Chemodenervation of eye, face and neck today.  Continues to have benefits from treatment  We adapted treatment today based on symptoms and exam.      Procedure: Chemodenervation with Botulinum Toxin A  Indication: Hemifacial Spasm and Hypercontracture  Injector: Kaci Trevino MD      Informed consent was obtained.  The skin was cleaned with antimicrobial solution and a topical ice was placed.     The patient was asked to systematically engage the muscles in the area to be injected. The tuberculin needles were used for subdermal injection and hemostasis was obtained with light digital pressure when needed. The skin was cleaned.     A total of 45 units were injected.  Botulinum toxin A type: Botox    Please see procedure log.    The patient tolerated the procedure well and there were no complications. Post procedure care instructions were given to the patient.      I spent a  total of 10 minutes face-to-face with Eran Almeida during today's office visit.  Over 50% of this time was spent counseling the patient and/or coordinating care regarding the sequelae of facial paralysis and the morbidity associated with facial nerve dysfunction. The time includes reviewing past injections records, discussing effects and adapting to needs for treatment. The injection time is not included in this time and was a separate 7 minutes.  See note for details.    Kaci Trevino MD

## 2022-09-09 NOTE — LETTER
September 9, 2022  Re: Eran Almeida  1955    Dear Dr. Dennis Call,    Thank you so much for referring Eran Almeida to the [facility]. I had the pleasure of visiting with Eran today.     Attached you will find a copy of my note. Please feel free to reach out to me with any questions, [phone].     Facial Plastic and Reconstructive Surgery      Eran Almeida presents today for evaluation of facial spasm. The patient has had the morbidity of facial nerve dysfunction and has significant morbidity, tightness and discomfort. Involuntary movement is also present around the eye and mouth which leads to discomfort, tightness and fatigue. We have discussed the hemifacial spasm and discomfort and chemodenervation as therapy. Current symptoms are a sequelae of the facial nerve injury. There are  involuntary and unwanted movements of the face that hinder good and functional intended movements.     PMH, PSH, meds and allergies are unchanged.    Assessment:  Clonic Hemifacial spasm  Spastic torticollis  Incomplete facial paralysis  Blepharospasm    Plan:   Chemodenervation of eye, face and neck today.  Continues to have benefits from treatment  We adapted treatment today based on symptoms and exam.      Procedure: Chemodenervation with Botulinum Toxin A  Indication: Hemifacial Spasm and Hypercontracture  Injector: Kaci Trevino MD      Informed consent was obtained.  The skin was cleaned with antimicrobial solution and a topical ice was placed.     The patient was asked to systematically engage the muscles in the area to be injected. The tuberculin needles were used for subdermal injection and hemostasis was obtained with light digital pressure when needed. The skin was cleaned.     A total of 45 units were injected.  Botulinum toxin A type: Botox    Please see procedure log.    The patient tolerated the procedure well and there were no complications. Post procedure care instructions were  given to the patient.      I spent a total of 10 minutes face-to-face with Eran Almeida during today's office visit.  Over 50% of this time was spent counseling the patient and/or coordinating care regarding the sequelae of facial paralysis and the morbidity associated with facial nerve dysfunction. The time includes reviewing past injections records, discussing effects and adapting to needs for treatment. The injection time is not included in this time and was a separate 7 minutes.  See note for details.    Your trust in our practice and care is much appreciated.    Sincerely,  Kaci Trevino MD

## 2022-09-09 NOTE — PROGRESS NOTES
Facial Plastic and Reconstructive Surgery      Eran Almeida presents today for evaluation of facial spasm. The patient has had the morbidity of facial nerve dysfunction and has significant morbidity, tightness and discomfort. Involuntary movement is also present around the eye and mouth which leads to discomfort, tightness and fatigue. We have discussed the hemifacial spasm and discomfort and chemodenervation as therapy. Current symptoms are a sequelae of the facial nerve injury. There are  involuntary and unwanted movements of the face that hinder good and functional intended movements.     PMH, PSH, meds and allergies are unchanged.    Assessment:  Clonic Hemifacial spasm  Spastic torticollis  Incomplete facial paralysis  Blepharospasm    Plan:   Chemodenervation of eye, face and neck today.  Continues to have benefits from treatment  We adapted treatment today based on symptoms and exam.      Procedure: Chemodenervation with Botulinum Toxin A  Indication: Hemifacial Spasm and Hypercontracture  Injector: Kaci Trevino MD      Informed consent was obtained.  The skin was cleaned with antimicrobial solution and a topical ice was placed.     The patient was asked to systematically engage the muscles in the area to be injected. The tuberculin needles were used for subdermal injection and hemostasis was obtained with light digital pressure when needed. The skin was cleaned.     A total of 45 units were injected.  Botulinum toxin A type: Botox    Please see procedure log.    The patient tolerated the procedure well and there were no complications. Post procedure care instructions were given to the patient.      I spent a total of 10 minutes face-to-face with Eranrosa Almeida during today's office visit.  Over 50% of this time was spent counseling the patient and/or coordinating care regarding the sequelae of facial paralysis and the morbidity associated with facial nerve dysfunction. The time includes reviewing  past injections records, discussing effects and adapting to needs for treatment. The injection time is not included in this time and was a separate 7 minutes.  See note for details.

## 2022-10-04 PROBLEM — F02.80 FRONTOTEMPORAL DEMENTIA (H): Status: ACTIVE | Noted: 2019-09-08

## 2022-11-21 ENCOUNTER — HEALTH MAINTENANCE LETTER (OUTPATIENT)
Age: 67
End: 2022-11-21

## 2022-12-29 ENCOUNTER — DOCUMENTATION ONLY (OUTPATIENT)
Dept: OTOLARYNGOLOGY | Facility: CLINIC | Age: 67
End: 2022-12-29

## 2022-12-29 DIAGNOSIS — G51.33 HEMIFACIAL SPASM AFFECTING BOTH SIDES OF FACE: ICD-10-CM

## 2022-12-29 DIAGNOSIS — G24.5 BLEPHAROSPASM: ICD-10-CM

## 2022-12-29 DIAGNOSIS — G24.3 SPASMODIC TORTICOLLIS: Primary | ICD-10-CM

## 2022-12-29 NOTE — PROGRESS NOTES
Pt moved to [place] and asked that referral be sent to Dr. Stoenr's office at [facility] for treatment of hemifacial spasms with Botox.    Referral faxed to [phone]    Johanny Perales RN  12/29/2022 11:31 AM

## 2023-05-05 ENCOUNTER — TELEPHONE (OUTPATIENT)
Dept: ADMINISTRATIVE | Age: 68
End: 2023-05-05

## 2023-05-05 DIAGNOSIS — R25.8 SYNKINESIS: Primary | ICD-10-CM

## 2023-07-31 ENCOUNTER — HOSPITAL ENCOUNTER (OUTPATIENT)
Dept: REHABILITATION | Age: 68
Discharge: STILL ADMITTED OR ADMINISTRATIVE | End: 2023-07-31
Attending: OTOLARYNGOLOGY

## 2023-07-31 PROCEDURE — 92507 TX SP LANG VOICE COMM INDIV: CPT | Performed by: SPEECH-LANGUAGE PATHOLOGIST

## 2023-07-31 PROCEDURE — 10004174 HB COUNTER-THERAPY VISIT SP: Performed by: SPEECH-LANGUAGE PATHOLOGIST

## 2023-07-31 PROCEDURE — 92523 SPEECH SOUND LANG COMPREHEN: CPT | Performed by: SPEECH-LANGUAGE PATHOLOGIST

## 2023-08-07 ENCOUNTER — HOSPITAL ENCOUNTER (OUTPATIENT)
Dept: REHABILITATION | Age: 68
Discharge: STILL ADMITTED OR ADMINISTRATIVE | End: 2023-08-07
Attending: OTOLARYNGOLOGY

## 2023-08-07 PROCEDURE — 92507 TX SP LANG VOICE COMM INDIV: CPT | Performed by: SPEECH-LANGUAGE PATHOLOGIST

## 2023-08-07 PROCEDURE — 10004174 HB COUNTER-THERAPY VISIT SP: Performed by: SPEECH-LANGUAGE PATHOLOGIST

## 2023-08-30 ENCOUNTER — HOSPITAL ENCOUNTER (OUTPATIENT)
Dept: REHABILITATION | Age: 68
Discharge: STILL ADMITTED OR ADMINISTRATIVE | End: 2023-08-30
Attending: OTOLARYNGOLOGY

## 2023-08-30 PROCEDURE — 10004174 HB COUNTER-THERAPY VISIT SP: Performed by: SPEECH-LANGUAGE PATHOLOGIST

## 2023-08-30 PROCEDURE — 92507 TX SP LANG VOICE COMM INDIV: CPT | Performed by: SPEECH-LANGUAGE PATHOLOGIST

## 2023-09-13 ENCOUNTER — HOSPITAL ENCOUNTER (OUTPATIENT)
Dept: REHABILITATION | Age: 68
Discharge: STILL ADMITTED OR ADMINISTRATIVE | End: 2023-09-13
Attending: OTOLARYNGOLOGY

## 2023-09-13 PROCEDURE — 92507 TX SP LANG VOICE COMM INDIV: CPT | Performed by: SPEECH-LANGUAGE PATHOLOGIST

## 2023-09-13 PROCEDURE — 10004174 HB COUNTER-THERAPY VISIT SP: Performed by: SPEECH-LANGUAGE PATHOLOGIST

## 2023-09-17 ENCOUNTER — HEALTH MAINTENANCE LETTER (OUTPATIENT)
Age: 68
End: 2023-09-17

## 2023-10-17 ENCOUNTER — HOSPITAL ENCOUNTER (OUTPATIENT)
Dept: REHABILITATION | Age: 68
Discharge: STILL ADMITTED OR ADMINISTRATIVE | End: 2023-10-17
Attending: OTOLARYNGOLOGY

## 2023-10-17 PROCEDURE — 92507 TX SP LANG VOICE COMM INDIV: CPT | Performed by: SPEECH-LANGUAGE PATHOLOGIST

## 2023-10-17 PROCEDURE — 10004174 HB COUNTER-THERAPY VISIT SP: Performed by: SPEECH-LANGUAGE PATHOLOGIST

## 2023-11-14 ENCOUNTER — HOSPITAL ENCOUNTER (OUTPATIENT)
Dept: REHABILITATION | Age: 68
Discharge: STILL ADMITTED OR ADMINISTRATIVE | End: 2023-11-14
Attending: OTOLARYNGOLOGY

## 2023-11-14 PROCEDURE — 10004174 HB COUNTER-THERAPY VISIT SP: Performed by: SPEECH-LANGUAGE PATHOLOGIST

## 2023-11-14 PROCEDURE — 92507 TX SP LANG VOICE COMM INDIV: CPT | Performed by: SPEECH-LANGUAGE PATHOLOGIST

## 2023-11-15 NOTE — PROGRESS NOTES
" Speech Pathology/Facial Paralysis Progress Summary - 12/04/17 1400   Signing Clinician's Name / Credentials   Signing clinician's name /credentials ANNA Schmitt, SLP   Session Number   Session Number 7  Patient last seen on 6/13/17 - apparently misunderstood recommendations to return following Botox treatment.    Progress/Recertification   Progress note due 03/04/18   Quick Add   Facial Paralysis Facial Paralysis   Subjective Report   Subjective Report Patient reports getting Botox every 3 months, last injections about 2 months ago (09/13/17). \"It's different this time, didn't seem to work as well, the one before this was a lot better. There's a crooked mouth. Last time it went away. I talk through the side of my mouth more now. The eye has diffent width than the other one.\"  (\"My talking has been more garbled.\" Didn't do previously given exercises but reports chewing gum a lot - discouraged this as it can contribute to increased synkinesis.)   Resting Symmetry Location    Eyelid Surgery [place] Weight  (Pt. interested in removing or lessening eye weight)   Nasolabial Fold More Pronounced  (Likely due to presence of increased synkinesis)   Lips Normal   Voluntary Movement: Minimal Effort Eye Closure    Minimal Effort Eye Closure Complete Yes   Minimal Effort Eye Closure-Synkinesis Zygomaticus;Mentalis;Platysma   General Severity of Synkinesis (Very slight, improved)   Voluntary Movement: Forehead   Forehead Elevation  Strength Rating % 10-15%  (Slightly improved)   Forehead-Synkinesis Levators;Zygomaticus;Platysma   General Severity of Synkinesis mild to moderate   Voluntary Movement: Open Mouth Smile   Open Mouth Smile Strength Rating% 35%  (Slightly decreased)   Open Mouth Smile-Synkinesis Orbicularis Occuli;Mentalis;Platysma   General Severity of Synkinesis mild  (But slightly increased)   Voluntary Movement: Closed Mouth Smile   Closed Mouth Smile Strength Rating % 75%  (Significantly improved)   Closed " "Mouth Smile-Synkinesis Orbicularis Occuli;Mentalis;Platysma   General Severity of Synkinesis mild  (Slightly increased)   Voluntary Movement: Snarl   Snarl Strength Rating % 35%   Snarl-Synkinesis Orbicularis Occuli;Zygomaticus;Mentalis   General Severity of Synkinesis mild to moderate  (Increased)   Voluntary Movement: Pucker   Pucker Strength Rating % 35%  (Improved )   Pucker-Synkinesis Orbicularis Occuli; Levators;Zygomaticus;Risorius;Mentalis;Platysma   General Severity of Synkinesis moderate  (Increased)   Swallow Goals   SLP Swallow Goals 1;2;3   Swallow Goal 1   Goal Identifier Oral Phase Swallowing   Goal Description Patient will report a 25% improvement in ability to drink from a bottle or can in comparison with status noted at time of initial evaluation.  (Still having difficulty with bottle, can, places cup on right, feels he can \"get more\" when he drinks from a cup.)   Target Date 03/04/18  (\"I still prefer a glass over other things.\")   Date Met 12/04/17  (Self-rating at 90-95% continue for another 5-10%)   Swallow Goal 2   Goal Identifier Oral Phase Swallowing    Goal Description Patient will report a 25% decrease in food pocketing on right in comparison of status noted at date of initial evaluation.    Target Date 03/04/18   Date Met 12/04/17  (10% \"to go\")   Swallow Goal 3   Goal Identifier Pharyngeal Phase Swallowing   Goal Description Therapist will monitor patient reports of feeling foods or liquids occasionally feeling \"stuck\" and will provide oropharyngeal strengthening exercises as indicated.   Target Date 05/20/17   Date Met 04/03/17  (No longer reports a problem.)   Facial Paralysis Goals   Facial Paralysis Goals 1;2;3   Facial Paralysis Goal 1   Goal Identifier Speech    Goal Description Patient will report a 25% improvement in ability to understood over the phone in comparison with status noted on date of initial evalutiaon.   Target Date 03/04/18   Date Met 05/08/17     Facial Paralysis " Goal 2   Goal Identifier Speech    Goal Description Patient will 25% reduced effort with speech compared with status noted on 12-04-17.  (New goal today)   Target Date 03/04/18   Facial Paralysis Goal 3   Goal Identifier Nonverbal Communication   Goal Description Patient will demonstrate a 10 point gain on her Facial Grading Score, per therapist judgement, reflecting gains in orofacial resting tone, voluntary movement and minimization of synkinesis if present.   (Decreased 1.25 points to 37.95/100 points due to increased synkinesis.)   Target Date 03/04/18   Date Met    Treatment Interventions    Treatment Interventions Treatment Swallow/Oral dysfunction;Treatment Speech/Lang/Voice;Facial Paralysis-Massage/Stretch;Facial Paralysis-Strengthening   Treatment Swallow/Oral dysfunction   Minutes 5 Minutes   Skilled Intervention Other  (Discussed oral stage swallowing status)   Patient Response (Verbalizes and demonstrates understanding)   Treatment Detail Instructed in/practiced new orofacial massage strategies to assist with labial and buccal ROM for oral stage swallowing (see below).   Progress Increased synkinesis maybe negatively affecting ability to drink from straw, bottle.   Treatment Speech/Lang/Voice   Skilled Intervention Provided written and verbal information on.;Provided feedback on performance of tasks;Facilitated respiratory, laryngeal, oral integration  (Orofacial strengthening exerciesfor speech, swall., nonverbal communication.)   Patient Response Demonstrated and verbalized understanding.   Treatment Detail Reassessed orofacial status. See Strengthening Section below   Progress Decreased 1.25 points due to increased synkinesis likely limiting ROM.   Massage/Stretch   Minutes 30   Skilled Intervention Provided written and verbal information on.;Educated patient on risks.;Modeled compensatory strategies;Provided feedback on performance of tasks   Patient Response Demonstrated and verbalized  "understanding.   Treatment Detail Instructed in and practiced orofacial massage strategies to reduce synkinesis and increase facial mobility for speech, oral stage swallowing and nonverbal communication: wheel, cheek pulls, buccinator stretch, brow stretch.    Progress Increased synkinesis   Strengthening   Minutes    Skilled Intervention Strengthening exercises put on hold in favor of time spent on thorough massage. Will resume as indicated. Instructed patient to hold off on massage, however, 10 days to 2 weeks after Botox injections.    Patient Response Demonstrated and verbalized understanding.   Assessments Completed   Facial Grading Score 37.95/100   House-Brackmann Score IV/VI   Education   Learner Patient;Family  (Wife present)   Readiness Eager   Method Booklet/handout;Explanation;Demonstration   Response Verbalizes understanding;Demonstrates understanding   Education Notes Recommended consultation with Dr. Kaci Trevino in approximately 2 months for possible Botox treatment.    Plan   Home program See Massage and Strengthening sections above.    Plan for next session Reassess status and advance home practice as indicated.    Comments   Comments Patient reports not being here because \"I thought we were done, but the nurse told me to come back.\"    Total Session Time   Total Treatment Time (sum of timed and untimed services) 45   AMBULATORY CLINICS ONLY-MEDICAL AND TREATMENT DIAGNOSIS   Medical Diagnosis Dysarthria; Oral Phase Dysarthria   Swallowing Diagnosis Oral Stage Dysphagia   Communication Diagnosis Dysarthria; Nonverbal Communication Impairment   Medical Diagnosis Oral Phase Dyphagia   Treatment Diagnosis Mild oral stage dysphagia charaterized by mild labial and linqual weakness/reduced coordination for forming a seal on drinking utensils, clearing food on right, transiting material posteriorally; possible mild reduced coordination for pharyngeal stage of swallow (not noted today, will f/u and " determine need for oropharyngeal strengthening exercises).       What Type Of Note Output Would You Prefer (Optional)?: Standard Output Hpi Title: Evaluation of Skin Lesions How Severe Are Your Spot(S)?: mild

## 2024-01-16 ENCOUNTER — APPOINTMENT (OUTPATIENT)
Dept: REHABILITATION | Age: 69
End: 2024-01-16
Attending: OTOLARYNGOLOGY

## 2024-02-01 ENCOUNTER — APPOINTMENT (OUTPATIENT)
Dept: REHABILITATION | Age: 69
End: 2024-02-01
Attending: OTOLARYNGOLOGY

## 2024-02-05 ENCOUNTER — HOSPITAL ENCOUNTER (OUTPATIENT)
Dept: REHABILITATION | Age: 69
Discharge: STILL ADMITTED OR ADMINISTRATIVE | End: 2024-02-05
Attending: OTOLARYNGOLOGY

## 2024-02-05 PROCEDURE — 10004174 HB COUNTER-THERAPY VISIT SP: Performed by: SPEECH-LANGUAGE PATHOLOGIST

## 2024-02-05 PROCEDURE — 92507 TX SP LANG VOICE COMM INDIV: CPT | Performed by: SPEECH-LANGUAGE PATHOLOGIST

## 2024-02-27 ENCOUNTER — HOSPITAL ENCOUNTER (OUTPATIENT)
Dept: REHABILITATION | Age: 69
Discharge: STILL ADMITTED OR ADMINISTRATIVE | End: 2024-02-27
Attending: OTOLARYNGOLOGY

## 2024-02-27 PROCEDURE — 10004174 HB COUNTER-THERAPY VISIT SP: Performed by: SPEECH-LANGUAGE PATHOLOGIST

## 2024-02-27 PROCEDURE — 92507 TX SP LANG VOICE COMM INDIV: CPT | Performed by: SPEECH-LANGUAGE PATHOLOGIST

## 2024-06-27 ENCOUNTER — APPOINTMENT (OUTPATIENT)
Dept: REHABILITATION | Age: 69
End: 2024-06-27
Attending: OTOLARYNGOLOGY

## 2024-07-16 ENCOUNTER — HOSPITAL ENCOUNTER (OUTPATIENT)
Dept: REHABILITATION | Age: 69
Discharge: STILL ADMITTED OR ADMINISTRATIVE | End: 2024-07-16
Attending: OTOLARYNGOLOGY

## 2024-07-16 PROCEDURE — 92507 TX SP LANG VOICE COMM INDIV: CPT | Performed by: SPEECH-LANGUAGE PATHOLOGIST

## 2024-07-16 PROCEDURE — 10004174 HB COUNTER-THERAPY VISIT SP: Performed by: SPEECH-LANGUAGE PATHOLOGIST

## 2024-07-18 DIAGNOSIS — R25.8 SYNKINESIS: Primary | ICD-10-CM

## 2024-08-08 ENCOUNTER — HOSPITAL ENCOUNTER (OUTPATIENT)
Dept: REHABILITATION | Age: 69
Discharge: STILL ADMITTED OR ADMINISTRATIVE | End: 2024-08-08
Attending: OTOLARYNGOLOGY

## 2024-08-08 PROCEDURE — 10004174 HB COUNTER-THERAPY VISIT SP: Performed by: SPEECH-LANGUAGE PATHOLOGIST

## 2024-08-08 PROCEDURE — 92507 TX SP LANG VOICE COMM INDIV: CPT | Performed by: SPEECH-LANGUAGE PATHOLOGIST

## 2024-09-05 ENCOUNTER — HOSPITAL ENCOUNTER (OUTPATIENT)
Dept: REHABILITATION | Age: 69
Discharge: STILL ADMITTED OR ADMINISTRATIVE | End: 2024-09-05
Attending: OTOLARYNGOLOGY

## 2024-09-05 PROCEDURE — 10004174 HB COUNTER-THERAPY VISIT SP: Performed by: SPEECH-LANGUAGE PATHOLOGIST

## 2024-09-05 PROCEDURE — 92507 TX SP LANG VOICE COMM INDIV: CPT | Performed by: SPEECH-LANGUAGE PATHOLOGIST

## 2024-11-09 ENCOUNTER — HEALTH MAINTENANCE LETTER (OUTPATIENT)
Age: 69
End: 2024-11-09

## 2024-11-12 ENCOUNTER — TELEPHONE (OUTPATIENT)
Dept: REHABILITATION | Age: 69
End: 2024-11-12

## 2024-11-26 ENCOUNTER — HOSPITAL ENCOUNTER (OUTPATIENT)
Dept: REHABILITATION | Age: 69
Discharge: STILL ADMITTED OR ADMINISTRATIVE | End: 2024-11-26
Attending: OTOLARYNGOLOGY

## 2024-11-26 PROCEDURE — 10004174 HB COUNTER-THERAPY VISIT SP: Performed by: SPEECH-LANGUAGE PATHOLOGIST

## 2024-11-26 PROCEDURE — 92507 TX SP LANG VOICE COMM INDIV: CPT | Performed by: SPEECH-LANGUAGE PATHOLOGIST

## 2025-01-09 ENCOUNTER — APPOINTMENT (OUTPATIENT)
Dept: REHABILITATION | Age: 70
End: 2025-01-09
Attending: OTOLARYNGOLOGY

## 2025-01-23 ENCOUNTER — HOSPITAL ENCOUNTER (OUTPATIENT)
Dept: REHABILITATION | Age: 70
Discharge: STILL ADMITTED OR ADMINISTRATIVE | End: 2025-01-23
Attending: FAMILY MEDICINE

## 2025-01-23 PROCEDURE — 10004174 HB COUNTER-THERAPY VISIT SP: Performed by: SPEECH-LANGUAGE PATHOLOGIST

## 2025-01-23 PROCEDURE — 92507 TX SP LANG VOICE COMM INDIV: CPT | Performed by: SPEECH-LANGUAGE PATHOLOGIST

## 2025-04-08 ENCOUNTER — HOSPITAL ENCOUNTER (OUTPATIENT)
Dept: REHABILITATION | Age: 70
Discharge: STILL ADMITTED OR ADMINISTRATIVE | End: 2025-04-08
Attending: FAMILY MEDICINE

## 2025-04-08 PROCEDURE — 10004174 HB COUNTER-THERAPY VISIT SP: Performed by: SPEECH-LANGUAGE PATHOLOGIST

## 2025-04-08 PROCEDURE — 92507 TX SP LANG VOICE COMM INDIV: CPT | Performed by: SPEECH-LANGUAGE PATHOLOGIST

## 2025-05-14 ENCOUNTER — HOSPITAL ENCOUNTER (OUTPATIENT)
Dept: REHABILITATION | Age: 70
Discharge: STILL ADMITTED OR ADMINISTRATIVE | End: 2025-05-14
Attending: FAMILY MEDICINE

## 2025-05-14 PROCEDURE — 10004174 HB COUNTER-THERAPY VISIT SP: Performed by: SPEECH-LANGUAGE PATHOLOGIST

## 2025-05-14 PROCEDURE — 92507 TX SP LANG VOICE COMM INDIV: CPT | Performed by: SPEECH-LANGUAGE PATHOLOGIST

## 2025-06-09 PROBLEM — J38.3 VOCAL CORD DYSFUNCTION: Status: ACTIVE | Noted: 2020-09-22

## 2025-06-09 PROBLEM — F33.1 MAJOR DEPRESSIVE DISORDER, RECURRENT EPISODE, MODERATE (CMD): Chronic | Status: ACTIVE | Noted: 2025-01-17

## 2025-06-09 PROBLEM — Z96.641 S/P HIP REPLACEMENT, RIGHT: Status: ACTIVE | Noted: 2022-11-22

## 2025-06-09 PROBLEM — G51.39 HEMIFACIAL SPASM: Status: ACTIVE | Noted: 2017-06-07

## 2025-06-09 PROBLEM — R49.0 DYSPHONIA: Status: ACTIVE | Noted: 2017-03-20

## 2025-06-09 PROBLEM — D46.9 MYELODYSPLASTIC SYNDROME, UNSPECIFIED  (CMD): Chronic | Status: ACTIVE | Noted: 2023-08-11

## 2025-06-09 PROBLEM — S06.9X9S: Chronic | Status: ACTIVE | Noted: 2023-08-11

## 2025-06-09 PROBLEM — N52.9 ERECTILE DYSFUNCTION: Status: ACTIVE | Noted: 2023-01-17

## 2025-06-09 PROBLEM — K21.9 GASTROESOPHAGEAL REFLUX DISEASE: Status: ACTIVE | Noted: 2023-01-17

## 2025-06-09 PROBLEM — Z96.642 S/P HIP REPLACEMENT, LEFT: Status: ACTIVE | Noted: 2024-01-09

## 2025-06-09 PROBLEM — Z79.899 CONTROLLED SUBSTANCE AGREEMENT SIGNED: Status: ACTIVE | Noted: 2025-02-28

## 2025-06-09 PROBLEM — G51.0 FACIAL PARALYSIS: Status: ACTIVE | Noted: 2017-03-20

## 2025-06-09 PROBLEM — E66.01 MORBID OBESITY  (CMD): Status: ACTIVE | Noted: 2021-05-11

## 2025-06-09 PROBLEM — R47.1 DYSARTHRIA: Status: ACTIVE | Noted: 2017-03-20

## 2025-06-09 PROBLEM — F41.1 GAD (GENERALIZED ANXIETY DISORDER): Status: ACTIVE | Noted: 2023-01-17

## 2025-06-09 PROBLEM — S72.91XA RIGHT FEMORAL FRACTURE (CMD): Status: ACTIVE | Noted: 2022-11-21

## 2025-06-09 PROBLEM — N31.9 NEUROGENIC BLADDER: Status: ACTIVE | Noted: 2020-10-29

## 2025-06-09 PROBLEM — R45.851 SUICIDE IDEATION: Status: ACTIVE | Noted: 2025-02-28

## 2025-06-09 PROBLEM — G47.33 OBSTRUCTIVE SLEEP APNEA ON CPAP: Status: ACTIVE | Noted: 2022-12-09

## 2025-06-09 PROBLEM — F02.818: Chronic | Status: ACTIVE | Noted: 2023-08-11

## 2025-06-09 PROBLEM — Z87.820 HISTORY OF TRAUMATIC BRAIN INJURY: Status: ACTIVE | Noted: 2022-10-14

## 2025-06-09 PROBLEM — Z01.818 PREOP TESTING: Status: ACTIVE | Noted: 2023-11-27

## 2025-06-09 PROBLEM — M43.6 SPASTIC TORTICOLLIS: Status: ACTIVE | Noted: 2020-07-24

## 2025-06-09 PROBLEM — G31.09 FRONTOTEMPORAL DEMENTIA (CMD): Status: ACTIVE | Noted: 2019-09-08

## 2025-06-09 PROBLEM — F33.0 MAJOR DEPRESSIVE DISORDER, RECURRENT EPISODE, MILD (CMD): Chronic | Status: ACTIVE | Noted: 2025-04-15

## 2025-06-09 PROBLEM — R13.11 ORAL PHASE DYSPHAGIA: Status: ACTIVE | Noted: 2017-03-20

## 2025-06-09 PROBLEM — C80.1 MALIGNANT NEOPLASM  (CMD): Chronic | Status: ACTIVE | Noted: 2022-10-14

## 2025-06-09 PROBLEM — R41.0 CONFUSION: Status: ACTIVE | Noted: 2019-03-07

## 2025-06-09 PROBLEM — G24.3 SPASMODIC TORTICOLLIS: Status: ACTIVE | Noted: 2021-03-15

## 2025-06-09 PROBLEM — F33.41 RECURRENT MAJOR DEPRESSIVE DISORDER IN PARTIAL REMISSION (CMD): Chronic | Status: ACTIVE | Noted: 2023-08-11

## 2025-06-09 PROBLEM — F41.9 ANXIETY: Status: ACTIVE | Noted: 2019-03-07

## 2025-06-09 PROBLEM — G24.5 BLEPHAROSPASM: Status: ACTIVE | Noted: 2018-03-14

## 2025-06-09 PROBLEM — R79.89 HIGH SERUM LACTATE: Status: ACTIVE | Noted: 2019-03-07

## 2025-06-09 PROBLEM — F02.80 FRONTOTEMPORAL DEMENTIA (CMD): Status: ACTIVE | Noted: 2019-09-08

## 2025-06-11 PROBLEM — F41.8 OTHER SPECIFIED ANXIETY DISORDERS: Status: ACTIVE | Noted: 2017-11-03

## 2025-06-12 PROBLEM — M70.61 GREATER TROCHANTERIC BURSITIS OF RIGHT HIP: Status: ACTIVE | Noted: 2025-06-12

## 2025-07-15 ENCOUNTER — HOSPITAL ENCOUNTER (OUTPATIENT)
Dept: REHABILITATION | Age: 70
Discharge: STILL ADMITTED OR ADMINISTRATIVE | End: 2025-07-15
Attending: FAMILY MEDICINE

## 2025-07-15 PROCEDURE — 10004174 HB COUNTER-THERAPY VISIT SP: Performed by: SPEECH-LANGUAGE PATHOLOGIST

## 2025-07-15 PROCEDURE — 92507 TX SP LANG VOICE COMM INDIV: CPT | Performed by: SPEECH-LANGUAGE PATHOLOGIST

## 2025-08-20 DIAGNOSIS — R25.8 SYNKINESIS: Primary | ICD-10-CM

## 2025-08-28 ENCOUNTER — HOSPITAL ENCOUNTER (OUTPATIENT)
Dept: REHABILITATION | Age: 70
Discharge: STILL ADMITTED OR ADMINISTRATIVE | End: 2025-08-28
Attending: FAMILY MEDICINE

## 2025-08-28 PROCEDURE — 92507 TX SP LANG VOICE COMM INDIV: CPT | Performed by: SPEECH-LANGUAGE PATHOLOGIST

## 2025-08-28 PROCEDURE — 10004174 HB COUNTER-THERAPY VISIT SP: Performed by: SPEECH-LANGUAGE PATHOLOGIST

## (undated) DEVICE — ESU NDL COLORADO MICRO 3CM STR N103A

## (undated) DEVICE — PEN MARKING SKIN TYCO DEVON DUAL TIP 31145868

## (undated) DEVICE — PACK ENT MINOR CUSTOM ASC

## (undated) DEVICE — GLOVE PROTEXIS MICRO 7.0  2D73PM70

## (undated) DEVICE — LINEN TOWEL PACK X5 5464

## (undated) DEVICE — SOL NACL 0.9% IRRIG 500ML BOTTLE 2F7123

## (undated) DEVICE — DRSG TEGADERM 2 3/8X2 3/4" 1624W

## (undated) DEVICE — GLOVE PROTEXIS BLUE W/NEU-THERA 7.5  2D73EB75

## (undated) DEVICE — ESU GROUND PAD ADULT W/CORD E7507

## (undated) DEVICE — DRAPE SHEET HALF 40X60" 9358

## (undated) DEVICE — BLADE KNIFE SURG 15 371115

## (undated) DEVICE — EYE PREP BETADINE 5% SOLUTION 30ML 0065-0411-30

## (undated) DEVICE — PREP SKIN SCRUB TRAY 4461A

## (undated) DEVICE — SUCTION MANIFOLD NEPTUNE 2 SYS 4 PORT 0702-020-000

## (undated) DEVICE — SOL WATER IRRIG 500ML BOTTLE 2F7113

## (undated) DEVICE — SPONGE KITTNER 30-101

## (undated) RX ORDER — LIDOCAINE HYDROCHLORIDE 20 MG/ML
INJECTION, SOLUTION EPIDURAL; INFILTRATION; INTRACAUDAL; PERINEURAL
Status: DISPENSED
Start: 2021-06-03

## (undated) RX ORDER — ONDANSETRON 2 MG/ML
INJECTION INTRAMUSCULAR; INTRAVENOUS
Status: DISPENSED
Start: 2021-06-03

## (undated) RX ORDER — PROPOFOL 10 MG/ML
INJECTION, EMULSION INTRAVENOUS
Status: DISPENSED
Start: 2021-06-03

## (undated) RX ORDER — LIDOCAINE HYDROCHLORIDE AND EPINEPHRINE 10; 10 MG/ML; UG/ML
INJECTION, SOLUTION INFILTRATION; PERINEURAL
Status: DISPENSED
Start: 2021-06-03

## (undated) RX ORDER — LIDOCAINE HYDROCHLORIDE AND EPINEPHRINE BITARTRATE 20; .01 MG/ML; MG/ML
INJECTION, SOLUTION SUBCUTANEOUS
Status: DISPENSED
Start: 2017-01-23

## (undated) RX ORDER — FENTANYL CITRATE 50 UG/ML
INJECTION, SOLUTION INTRAMUSCULAR; INTRAVENOUS
Status: DISPENSED
Start: 2021-06-03

## (undated) RX ORDER — DEXAMETHASONE SODIUM PHOSPHATE 4 MG/ML
INJECTION, SOLUTION INTRA-ARTICULAR; INTRALESIONAL; INTRAMUSCULAR; INTRAVENOUS; SOFT TISSUE
Status: DISPENSED
Start: 2021-06-03